# Patient Record
Sex: FEMALE | Race: WHITE | Employment: FULL TIME | URBAN - METROPOLITAN AREA
[De-identification: names, ages, dates, MRNs, and addresses within clinical notes are randomized per-mention and may not be internally consistent; named-entity substitution may affect disease eponyms.]

---

## 2017-01-12 NOTE — TELEPHONE ENCOUNTER
Confirmed patient needs refill of Vyvanse 30 mg. Aware Dr. Alpa Jessica is out of the office today. Last refilled on 12/13/16 for #30 with 0 rf. Last seen on 12/13/16. No future appointments.

## 2017-02-09 NOTE — PROGRESS NOTES
Anuel Morrell is a 46year old female. HPI:   Pt is here to f/u on vyvanse. She takes it around 8am, by 9am is helping her, she can focus, get work done.  She is getting her work done on time, not forgetting things all the time, she reports this is \ denies any unusual skin lesions or rashes  RESPIRATORY: denies shortness of breath with exertion  CARDIOVASCULAR: denies chest pain on exertion  GI: denies abdominal pain/n/v  NEURO: denies headaches  PSYCH: see HPI; good mood    EXAM:   /80 mmHg  Pu

## 2017-02-20 ENCOUNTER — TELEPHONE (OUTPATIENT)
Dept: FAMILY MEDICINE CLINIC | Facility: CLINIC | Age: 52
End: 2017-02-20

## 2017-02-20 NOTE — TELEPHONE ENCOUNTER
MD Madisyn Dc Nurse                     I added an afternoon dose of adderall to her am vyvanse about a week ago, can you please call to see how it's going, thank you           Left message on voicemail/answering machine for patient to

## 2017-04-07 NOTE — TELEPHONE ENCOUNTER
From: Anuel Morrell  To: Odalys Orozco MD  Sent: 4/7/2017 11:06 AM CDT  Subject: Medication Renewal Request    Original authorizing provider: MD Anuel Concepcion would like a refill of the following medications:  amphetamine-dextroamph

## 2017-04-10 RX ORDER — DEXTROAMPHETAMINE SACCHARATE, AMPHETAMINE ASPARTATE, DEXTROAMPHETAMINE SULFATE AND AMPHETAMINE SULFATE 2.5; 2.5; 2.5; 2.5 MG/1; MG/1; MG/1; MG/1
10 TABLET ORAL DAILY
Qty: 30 TABLET | Refills: 0 | Status: SHIPPED | OUTPATIENT
Start: 2017-04-10 | End: 2017-05-24

## 2017-04-11 NOTE — TELEPHONE ENCOUNTER
From: Marylee Lee  To: Galindo Gil MD  Sent: 4/10/2017 6:39 PM CDT  Subject: Medication Renewal Request    Original authorizing provider: Daryl Christian, MD Marylee Lee would like a refill of the following medications:  Lisdexamfetamine Dimes

## 2017-05-24 RX ORDER — DEXTROAMPHETAMINE SACCHARATE, AMPHETAMINE ASPARTATE, DEXTROAMPHETAMINE SULFATE AND AMPHETAMINE SULFATE 2.5; 2.5; 2.5; 2.5 MG/1; MG/1; MG/1; MG/1
10 TABLET ORAL DAILY
Qty: 30 TABLET | Refills: 0 | Status: SHIPPED | OUTPATIENT
Start: 2017-05-24 | End: 2017-08-08

## 2017-07-11 ENCOUNTER — MED REC SCAN ONLY (OUTPATIENT)
Dept: FAMILY MEDICINE CLINIC | Facility: CLINIC | Age: 52
End: 2017-07-11

## 2017-07-26 ENCOUNTER — TELEPHONE (OUTPATIENT)
Dept: FAMILY MEDICINE CLINIC | Facility: CLINIC | Age: 52
End: 2017-07-26

## 2017-07-26 NOTE — TELEPHONE ENCOUNTER
Patient is having some left knee pain. Wondering if it's a torn meniscus? She tore the one in the other knee and it feels similar. She is wondering if she can be seen tomorrow? Her daughter is getting  this weekend. Please call back.

## 2017-07-26 NOTE — TELEPHONE ENCOUNTER
Spoke with the pt and she did this at the end of June- about3 weeks ago    She was at a water park in the [facility]- but no known injury- she has been doing the RICE treatment- it is a little swollen and it is not getting better  She can not kneel on the

## 2017-07-26 NOTE — TELEPHONE ENCOUNTER
Called the pt and advised of the recommendations- she has an ortho at [place]- she will call them

## 2017-07-26 NOTE — TELEPHONE ENCOUNTER
She probably should be seen since not getting better in 3 weeks--she may want to call ortho directly Dr. Lendell Cowden or his partners a good place to start [phone]

## 2017-08-08 RX ORDER — DEXTROAMPHETAMINE SACCHARATE, AMPHETAMINE ASPARTATE, DEXTROAMPHETAMINE SULFATE AND AMPHETAMINE SULFATE 2.5; 2.5; 2.5; 2.5 MG/1; MG/1; MG/1; MG/1
10 TABLET ORAL DAILY
Qty: 30 TABLET | Refills: 0 | Status: SHIPPED | OUTPATIENT
Start: 2017-08-08 | End: 2017-10-13

## 2017-10-13 ENCOUNTER — OFFICE VISIT (OUTPATIENT)
Dept: FAMILY MEDICINE CLINIC | Facility: CLINIC | Age: 52
End: 2017-10-13

## 2017-10-13 VITALS
DIASTOLIC BLOOD PRESSURE: 88 MMHG | BODY MASS INDEX: 37 KG/M2 | SYSTOLIC BLOOD PRESSURE: 138 MMHG | WEIGHT: 191 LBS | TEMPERATURE: 98 F

## 2017-10-13 DIAGNOSIS — J01.00 ACUTE NON-RECURRENT MAXILLARY SINUSITIS: Primary | ICD-10-CM

## 2017-10-13 PROCEDURE — 99213 OFFICE O/P EST LOW 20 MIN: CPT | Performed by: PHYSICIAN ASSISTANT

## 2017-10-13 RX ORDER — DEXTROAMPHETAMINE SACCHARATE, AMPHETAMINE ASPARTATE, DEXTROAMPHETAMINE SULFATE AND AMPHETAMINE SULFATE 2.5; 2.5; 2.5; 2.5 MG/1; MG/1; MG/1; MG/1
10 TABLET ORAL DAILY
Qty: 30 TABLET | Refills: 0 | Status: SHIPPED | OUTPATIENT
Start: 2017-10-13 | End: 2017-11-22

## 2017-10-13 RX ORDER — DOXYCYCLINE HYCLATE 100 MG/1
100 CAPSULE ORAL 2 TIMES DAILY
Qty: 20 CAPSULE | Refills: 0 | Status: SHIPPED | OUTPATIENT
Start: 2017-10-13 | End: 2017-10-23

## 2017-10-13 RX ORDER — FLUTICASONE PROPIONATE 50 MCG
2 SPRAY, SUSPENSION (ML) NASAL DAILY
Qty: 1 BOTTLE | Refills: 1 | Status: SHIPPED | OUTPATIENT
Start: 2017-10-13 | End: 2018-07-10

## 2017-10-13 NOTE — PATIENT INSTRUCTIONS
1.  Doxycycline 100 mg twice daily for 10 days. 2.  Flonase as prescribed. 2 sprays in each nostril daily, or 1 spray in each nostril twice daily.   If you develop a nosebleed, stop medication and restart at half the dose (1 spray in each nostril daily) · Over-the-counter decongestants may be used unless a similar medicine was prescribed. Nasal sprays work the fastest. Use one that contains phenylephrine or oxymetazoline. First blow the nose gently. Then use the spray.  Do not use these medicines more ofte © 4855-3628 [address], [address] [place]. All rights reserved. This information is not intended as a substitute for professional medical care. Always follow your healthcare professional's instructions.

## 2017-10-13 NOTE — PROGRESS NOTES
CHIEF COMPLAINT:   Patient presents with:  Sinus Problem: x 2 weeks,       HPI:   Cony Murray is a 45year old female who presents for cold symptoms for  2  weeks. Symptoms have progressed into sinus congestion and been worsening since onset.  Sinus co Smoking status: Never Smoker                                                              Smokeless tobacco: Never Used                      Comment: Non-smoker  Alcohol use:  Yes              Comment: 2-4 servings/week        REVIEW OF SYSTEMS:   GENERAL: Sig: Take 1 capsule (100 mg total) by mouth 2 (two) times daily. Fluticasone Propionate 50 MCG/ACT Nasal Suspension 1 Bottle 1      Si sprays by Each Nare route daily.            Risks, benefits, side effects of medication addressed and explaine · Heat may help soothe painful areas of the face. Use a towel soaked in hot water. Or,  the shower and direct the hot spray onto your face.  Using a vaporizer along with a menthol rub at night may also help.   · An expectorant containing guaifenesin · Vision problems, including blurred or double vision  · Fever of 100.4ºF (38ºC) or higher, or as directed by your healthcare provider  · Seizure  · Breathing problems  · Symptoms not resolving within 10 days  Date Last Reviewed: 4/13/2015  © 0300-1011 The

## 2017-10-13 NOTE — TELEPHONE ENCOUNTER
amphetamine-dextroamphetamine (ADDERALL) 10 MG Oral Tab     Lisdexamfetamine Dimesylate (VYVANSE) 30 MG Oral Cap      Pt will be the one picking up scripts

## 2017-10-27 PROBLEM — F90.2 ADHD (ATTENTION DEFICIT HYPERACTIVITY DISORDER), COMBINED TYPE: Status: ACTIVE | Noted: 2017-10-27

## 2017-10-27 NOTE — PROGRESS NOTES
Jose Johnson is a 45year old female. HPI:   Pt is here to f/u on his ADHD. 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   Side effects: none  Ho PROC UNLISTED   Family History   Problem Relation Age of Onset   • Breast Cancer Mother    • Cancer Mother      breast   • Hypertension Father    • Other[other] [OTHER] Father    • Cancer Father      prostate   • Cancer Sister 46     breast-now cancer free

## 2017-11-22 NOTE — TELEPHONE ENCOUNTER
LOV  10/27/2017  Last refill  Vyvanse 10/13/2017 #30 w. 0 RF  Adderall  10/13/2017   #30 w. 0 RF. Patient aware [facility] out until Monday. Please hold until Monday when [facility] back in office.

## 2017-11-22 NOTE — TELEPHONE ENCOUNTER
Pt needs refills of both Adderral and Vyvanse, 3 months, please. Pt know [address] out until Monday.  Call when ready

## 2017-11-25 ENCOUNTER — CHARTING TRANS (OUTPATIENT)
Dept: OTHER | Age: 52
End: 2017-11-25

## 2017-11-27 RX ORDER — DEXTROAMPHETAMINE SACCHARATE, AMPHETAMINE ASPARTATE, DEXTROAMPHETAMINE SULFATE AND AMPHETAMINE SULFATE 2.5; 2.5; 2.5; 2.5 MG/1; MG/1; MG/1; MG/1
10 TABLET ORAL DAILY
Qty: 30 TABLET | Refills: 0 | Status: SHIPPED | OUTPATIENT
Start: 2017-11-27 | End: 2017-12-27

## 2017-11-27 RX ORDER — DEXTROAMPHETAMINE SACCHARATE, AMPHETAMINE ASPARTATE, DEXTROAMPHETAMINE SULFATE AND AMPHETAMINE SULFATE 2.5; 2.5; 2.5; 2.5 MG/1; MG/1; MG/1; MG/1
10 TABLET ORAL DAILY
Qty: 30 TABLET | Refills: 0 | Status: SHIPPED | OUTPATIENT
Start: 2017-12-27 | End: 2018-01-26

## 2017-11-27 RX ORDER — DEXTROAMPHETAMINE SACCHARATE, AMPHETAMINE ASPARTATE, DEXTROAMPHETAMINE SULFATE AND AMPHETAMINE SULFATE 2.5; 2.5; 2.5; 2.5 MG/1; MG/1; MG/1; MG/1
10 TABLET ORAL DAILY
Qty: 30 TABLET | Refills: 0 | Status: SHIPPED | OUTPATIENT
Start: 2017-11-27 | End: 2017-11-27 | Stop reason: CLARIF

## 2017-11-27 RX ORDER — DEXTROAMPHETAMINE SACCHARATE, AMPHETAMINE ASPARTATE, DEXTROAMPHETAMINE SULFATE AND AMPHETAMINE SULFATE 2.5; 2.5; 2.5; 2.5 MG/1; MG/1; MG/1; MG/1
10 TABLET ORAL DAILY
Qty: 30 TABLET | Refills: 0 | Status: SHIPPED | OUTPATIENT
Start: 2018-01-26 | End: 2018-02-25

## 2017-12-05 ENCOUNTER — HOSPITAL ENCOUNTER (OUTPATIENT)
Age: 52
Discharge: HOME | End: 2017-12-05
Payer: COMMERCIAL

## 2017-12-05 ENCOUNTER — APPOINTMENT (OUTPATIENT)
Dept: GENERAL RADIOLOGY | Age: 52
End: 2017-12-05
Attending: NURSE PRACTITIONER
Payer: COMMERCIAL

## 2017-12-05 VITALS
TEMPERATURE: 98 F | BODY MASS INDEX: 36 KG/M2 | HEART RATE: 101 BPM | DIASTOLIC BLOOD PRESSURE: 87 MMHG | RESPIRATION RATE: 16 BRPM | SYSTOLIC BLOOD PRESSURE: 135 MMHG | WEIGHT: 189 LBS | OXYGEN SATURATION: 98 %

## 2017-12-05 DIAGNOSIS — J20.9 ACUTE BRONCHITIS, UNSPECIFIED ORGANISM: ICD-10-CM

## 2017-12-05 DIAGNOSIS — J06.9 VIRAL UPPER RESPIRATORY TRACT INFECTION WITH COUGH: Primary | ICD-10-CM

## 2017-12-05 PROCEDURE — 71020 XR CHEST PA + LAT CHEST (CPT=71020): CPT | Performed by: NURSE PRACTITIONER

## 2017-12-05 PROCEDURE — 99213 OFFICE O/P EST LOW 20 MIN: CPT

## 2017-12-05 PROCEDURE — 99214 OFFICE O/P EST MOD 30 MIN: CPT

## 2017-12-05 RX ORDER — PREDNISONE 20 MG/1
40 TABLET ORAL DAILY
Qty: 10 TABLET | Refills: 0 | Status: SHIPPED | OUTPATIENT
Start: 2017-12-05 | End: 2017-12-10

## 2017-12-05 NOTE — ED PROVIDER NOTES
Patient Seen in: [address] [facility]    History   Patient presents with:  Cough/URI    Stated Complaint: coughing     49-year-old female presents today with complaints of congestion, runny nose, and cough.   States cough started out was produc oriented to person, place, and time. She appears well-developed and well-nourished. No distress. HENT:   Head: Normocephalic.    Right Ear: Hearing, tympanic membrane and external ear normal.   Left Ear: Hearing, tympanic membrane and external ear normal. wheezing, or uncontrolled fever.         Disposition and Plan     Clinical Impression:  Viral upper respiratory tract infection with cough  (primary encounter diagnosis)  Acute bronchitis, unspecified organism    Disposition:  Discharge  12/5/2017  1:53 pm

## 2017-12-08 ENCOUNTER — CHARTING TRANS (OUTPATIENT)
Dept: OTOLARYNGOLOGY | Age: 52
End: 2017-12-08

## 2018-01-09 ENCOUNTER — MED REC SCAN ONLY (OUTPATIENT)
Dept: FAMILY MEDICINE CLINIC | Facility: CLINIC | Age: 53
End: 2018-01-09

## 2018-04-06 RX ORDER — DEXTROAMPHETAMINE SACCHARATE, AMPHETAMINE ASPARTATE, DEXTROAMPHETAMINE SULFATE AND AMPHETAMINE SULFATE 2.5; 2.5; 2.5; 2.5 MG/1; MG/1; MG/1; MG/1
10 TABLET ORAL DAILY
Qty: 30 TABLET | Refills: 0 | Status: CANCELLED | OUTPATIENT
Start: 2018-04-06 | End: 2018-05-06

## 2018-04-06 RX ORDER — DEXTROAMPHETAMINE SACCHARATE, AMPHETAMINE ASPARTATE, DEXTROAMPHETAMINE SULFATE AND AMPHETAMINE SULFATE 2.5; 2.5; 2.5; 2.5 MG/1; MG/1; MG/1; MG/1
10 TABLET ORAL DAILY
Qty: 30 TABLET | Refills: 0 | Status: SHIPPED | OUTPATIENT
Start: 2018-04-06 | End: 2018-11-02

## 2018-04-06 NOTE — TELEPHONE ENCOUNTER
LRF   vyvanse 11/27/17  adderall  11/27/17    LOV 10/27/17  Future Appointments  Date Time Provider Gisel Atwood   7/10/2018 1:00 PM Lenny Vela MD [facility] Murtaza Murillo

## 2018-04-06 NOTE — TELEPHONE ENCOUNTER
Called and spoke to patient, notified that scripts are ready for . She states he has appt booked for July, as this is the earliest she could get in, will you need to see her before then or okay to wait until that physical appt.   Future Appointment

## 2018-04-07 NOTE — TELEPHONE ENCOUNTER
Pt picked up script for ADDERALL and VYVASE. Pt [place] DL # [id number]. Pt DL still shows last name Anna-new last name. Insurance will be update to new last name soon. Insurance still under old last name of Isa.

## 2018-04-12 ENCOUNTER — HOSPITAL ENCOUNTER (OUTPATIENT)
Age: 53
Discharge: HOME | End: 2018-04-12
Attending: FAMILY MEDICINE
Payer: COMMERCIAL

## 2018-04-12 VITALS
HEART RATE: 87 BPM | SYSTOLIC BLOOD PRESSURE: 137 MMHG | DIASTOLIC BLOOD PRESSURE: 83 MMHG | RESPIRATION RATE: 16 BRPM | TEMPERATURE: 99 F | OXYGEN SATURATION: 96 %

## 2018-04-12 DIAGNOSIS — J02.0 STREPTOCOCCAL SORE THROAT: Primary | ICD-10-CM

## 2018-04-12 PROCEDURE — 99214 OFFICE O/P EST MOD 30 MIN: CPT

## 2018-04-12 PROCEDURE — 87430 STREP A AG IA: CPT | Performed by: FAMILY MEDICINE

## 2018-04-12 PROCEDURE — 99213 OFFICE O/P EST LOW 20 MIN: CPT

## 2018-04-12 RX ORDER — CEPHALEXIN 500 MG/1
500 CAPSULE ORAL 3 TIMES DAILY
Qty: 30 CAPSULE | Refills: 0 | Status: SHIPPED | OUTPATIENT
Start: 2018-04-12 | End: 2018-04-22

## 2018-04-13 NOTE — ED PROVIDER NOTES
Patient Seen in: [address] [facility]    History   Patient presents with:  Sore Throat  Headache (neurologic)  Cough/URI  Ear Problem Pain (neurosensory)    Stated Complaint: sore throat  with body aches    HPI  52-year-old female presents drainage or sinus tenderness.   Throat: abnormal findings: moderate oropharyngeal erythema  Neck: mild anterior cervical adenopathy, no carotid bruit, no JVD and supple, symmetrical, trachea midline  Lungs: clear to auscultation bilaterally  Heart: S1, S2 n

## 2018-04-13 NOTE — ED INITIAL ASSESSMENT (HPI)
Pt sts sore throat, HA, body aches, ear pressure, cough, chills, fever (100.4) began last night. Sts had been feeling tired and run down earlier in week.

## 2018-07-10 ENCOUNTER — OFFICE VISIT (OUTPATIENT)
Dept: FAMILY MEDICINE CLINIC | Facility: CLINIC | Age: 53
End: 2018-07-10

## 2018-07-10 VITALS
DIASTOLIC BLOOD PRESSURE: 80 MMHG | HEART RATE: 72 BPM | BODY MASS INDEX: 37.11 KG/M2 | TEMPERATURE: 99 F | RESPIRATION RATE: 16 BRPM | SYSTOLIC BLOOD PRESSURE: 122 MMHG | WEIGHT: 189 LBS | HEIGHT: 60 IN

## 2018-07-10 DIAGNOSIS — M25.562 CHRONIC PAIN OF BOTH KNEES: ICD-10-CM

## 2018-07-10 DIAGNOSIS — Z00.00 ROUTINE HISTORY AND PHYSICAL EXAMINATION OF ADULT: Primary | ICD-10-CM

## 2018-07-10 DIAGNOSIS — G89.29 CHRONIC PAIN OF BOTH KNEES: ICD-10-CM

## 2018-07-10 DIAGNOSIS — Z12.11 COLON CANCER SCREENING: ICD-10-CM

## 2018-07-10 DIAGNOSIS — F90.2 ADHD (ATTENTION DEFICIT HYPERACTIVITY DISORDER), COMBINED TYPE: ICD-10-CM

## 2018-07-10 DIAGNOSIS — Z13.1 DIABETES MELLITUS SCREENING: ICD-10-CM

## 2018-07-10 DIAGNOSIS — Z12.31 ENCOUNTER FOR SCREENING MAMMOGRAM FOR BREAST CANCER: ICD-10-CM

## 2018-07-10 DIAGNOSIS — Z13.29 THYROID DISORDER SCREEN: ICD-10-CM

## 2018-07-10 DIAGNOSIS — Z13.0 SCREENING, ANEMIA, DEFICIENCY, IRON: ICD-10-CM

## 2018-07-10 DIAGNOSIS — M25.561 CHRONIC PAIN OF BOTH KNEES: ICD-10-CM

## 2018-07-10 DIAGNOSIS — Z13.220 LIPID SCREENING: ICD-10-CM

## 2018-07-10 DIAGNOSIS — E55.9 VITAMIN D DEFICIENCY: ICD-10-CM

## 2018-07-10 DIAGNOSIS — R53.83 FATIGUE, UNSPECIFIED TYPE: ICD-10-CM

## 2018-07-10 LAB
BASOPHILS # BLD AUTO: 0.03 X10(3) UL (ref 0–0.1)
BASOPHILS NFR BLD AUTO: 0.4 %
EOSINOPHIL # BLD AUTO: 0.04 X10(3) UL (ref 0–0.3)
EOSINOPHIL NFR BLD AUTO: 0.6 %
ERYTHROCYTE [DISTWIDTH] IN BLOOD BY AUTOMATED COUNT: 12.8 % (ref 11.5–16)
EST. AVERAGE GLUCOSE BLD GHB EST-MCNC: 100 MG/DL (ref 68–126)
HBA1C MFR BLD HPLC: 5.1 % (ref ?–5.7)
HCT VFR BLD AUTO: 42.4 % (ref 34–50)
HGB BLD-MCNC: 14 G/DL (ref 12–16)
IMMATURE GRANULOCYTE COUNT: 0.02 X10(3) UL (ref 0–1)
IMMATURE GRANULOCYTE RATIO %: 0.3 %
LYMPHOCYTES # BLD AUTO: 2.2 X10(3) UL (ref 0.9–4)
LYMPHOCYTES NFR BLD AUTO: 31.8 %
MCH RBC QN AUTO: 30.4 PG (ref 27–33.2)
MCHC RBC AUTO-ENTMCNC: 33 G/DL (ref 31–37)
MCV RBC AUTO: 92.2 FL (ref 81–100)
MONOCYTES # BLD AUTO: 0.43 X10(3) UL (ref 0.1–1)
MONOCYTES NFR BLD AUTO: 6.2 %
NEUTROPHIL ABS PRELIM: 4.19 X10 (3) UL (ref 1.3–6.7)
NEUTROPHILS # BLD AUTO: 4.19 X10(3) UL (ref 1.3–6.7)
NEUTROPHILS NFR BLD AUTO: 60.7 %
PLATELET # BLD AUTO: 322 10(3)UL (ref 150–450)
RBC # BLD AUTO: 4.6 X10(6)UL (ref 3.8–5.1)
RED CELL DISTRIBUTION WIDTH-SD: 42.5 FL (ref 35.1–46.3)
WBC # BLD AUTO: 6.9 X10(3) UL (ref 4–13)

## 2018-07-10 PROCEDURE — 86376 MICROSOMAL ANTIBODY EACH: CPT | Performed by: FAMILY MEDICINE

## 2018-07-10 PROCEDURE — 86800 THYROGLOBULIN ANTIBODY: CPT | Performed by: FAMILY MEDICINE

## 2018-07-10 PROCEDURE — 83735 ASSAY OF MAGNESIUM: CPT | Performed by: FAMILY MEDICINE

## 2018-07-10 PROCEDURE — 84439 ASSAY OF FREE THYROXINE: CPT | Performed by: FAMILY MEDICINE

## 2018-07-10 PROCEDURE — 36415 COLL VENOUS BLD VENIPUNCTURE: CPT | Performed by: FAMILY MEDICINE

## 2018-07-10 PROCEDURE — 80050 GENERAL HEALTH PANEL: CPT | Performed by: FAMILY MEDICINE

## 2018-07-10 PROCEDURE — 83036 HEMOGLOBIN GLYCOSYLATED A1C: CPT | Performed by: FAMILY MEDICINE

## 2018-07-10 PROCEDURE — 99396 PREV VISIT EST AGE 40-64: CPT | Performed by: FAMILY MEDICINE

## 2018-07-10 PROCEDURE — 82607 VITAMIN B-12: CPT | Performed by: FAMILY MEDICINE

## 2018-07-10 PROCEDURE — 84480 ASSAY TRIIODOTHYRONINE (T3): CPT | Performed by: FAMILY MEDICINE

## 2018-07-10 PROCEDURE — 80061 LIPID PANEL: CPT | Performed by: FAMILY MEDICINE

## 2018-07-10 PROCEDURE — 82306 VITAMIN D 25 HYDROXY: CPT | Performed by: FAMILY MEDICINE

## 2018-07-10 PROCEDURE — 82728 ASSAY OF FERRITIN: CPT | Performed by: FAMILY MEDICINE

## 2018-07-10 RX ORDER — MULTIVIT-MIN/IRON FUM/FOLIC AC 7.5 MG-4
1 TABLET ORAL DAILY
COMMUNITY

## 2018-07-10 RX ORDER — DEXTROAMPHETAMINE SACCHARATE, AMPHETAMINE ASPARTATE, DEXTROAMPHETAMINE SULFATE AND AMPHETAMINE SULFATE 2.5; 2.5; 2.5; 2.5 MG/1; MG/1; MG/1; MG/1
TABLET ORAL
Qty: 30 TABLET | Refills: 0 | Status: SHIPPED | OUTPATIENT
Start: 2018-07-10 | End: 2018-09-08

## 2018-07-10 RX ORDER — FLUTICASONE PROPIONATE 50 MCG
2 SPRAY, SUSPENSION (ML) NASAL DAILY
Qty: 1 BOTTLE | Refills: 1 | Status: SHIPPED | OUTPATIENT
Start: 2018-07-10 | End: 2019-07-05

## 2018-07-10 RX ORDER — ALBUTEROL SULFATE 90 UG/1
2 AEROSOL, METERED RESPIRATORY (INHALATION) EVERY 4 HOURS PRN
Qty: 1 INHALER | Refills: 1 | Status: SHIPPED | OUTPATIENT
Start: 2018-07-10 | End: 2021-03-19

## 2018-07-10 RX ORDER — DEXTROAMPHETAMINE SACCHARATE, AMPHETAMINE ASPARTATE, DEXTROAMPHETAMINE SULFATE AND AMPHETAMINE SULFATE 2.5; 2.5; 2.5; 2.5 MG/1; MG/1; MG/1; MG/1
TABLET ORAL
Qty: 30 TABLET | Refills: 0 | Status: SHIPPED | OUTPATIENT
Start: 2018-07-10 | End: 2018-08-09

## 2018-07-10 RX ORDER — DEXTROAMPHETAMINE SACCHARATE, AMPHETAMINE ASPARTATE, DEXTROAMPHETAMINE SULFATE AND AMPHETAMINE SULFATE 2.5; 2.5; 2.5; 2.5 MG/1; MG/1; MG/1; MG/1
TABLET ORAL
Qty: 30 TABLET | Refills: 0 | Status: SHIPPED | OUTPATIENT
Start: 2018-07-10 | End: 2018-10-08

## 2018-07-10 RX ORDER — ALBUTEROL SULFATE 90 UG/1
2 AEROSOL, METERED RESPIRATORY (INHALATION) EVERY 4 HOURS PRN
Qty: 1 INHALER | Refills: 1 | Status: SHIPPED | OUTPATIENT
Start: 2018-07-10 | End: 2018-07-10

## 2018-07-10 NOTE — PROGRESS NOTES
HPI:   Tomás Tabares is a 52year old female who presents for a complete physical exam.  Patient complains of bi knee issues.  [facility]     If she sits for awhile she feels like she walks like a 91yo when she first gets up an (MULTI-VITAMIN/MINERALS) Oral Tab Take 1 tablet by mouth daily. Disp:  Rfl:    TURMERIC CURCUMIN OR Take by mouth. Disp:  Rfl:    Glucosamine-Chondroit-MSM-C-Mn (FLEXI JOINT OR) Take by mouth.  Disp:  Rfl:    Albuterol Sulfate  (90 Base) MCG/ACT Jude Kraft Other [OTHER] Sister    • Other Walter Payne Sister      RA   • Other [OTHER] Daughter      histiocytosis, migraines      Social History:   Smoking status: Never Smoker                                                              Smokeless tobacco: Never Used auscultation  CARDIO: RRR without murmur  GI: good BS's,no masses, HSM or tenderness  MUSCULOSKELETAL: back is not tender, FROM of UEs and LEs bilaterally; mild crepitus with flexion/extension of knees, no reproducible pain, no redness/swelling, no instabi

## 2018-07-11 LAB
25-HYDROXYVITAMIN D (TOTAL): 31.4 NG/ML (ref 30–100)
ALBUMIN SERPL-MCNC: 3.9 G/DL (ref 3.5–4.8)
ALP LIVER SERPL-CCNC: 100 U/L (ref 41–108)
ALT SERPL-CCNC: 32 U/L (ref 14–54)
ANTI-THYROGLOBULIN: 23 U/ML (ref ?–60)
ANTI-THYROPEROXIDASE: <28 U/ML (ref ?–60)
AST SERPL-CCNC: 21 U/L (ref 15–41)
BILIRUB SERPL-MCNC: 0.7 MG/DL (ref 0.1–2)
BUN BLD-MCNC: 19 MG/DL (ref 8–20)
CALCIUM BLD-MCNC: 9.5 MG/DL (ref 8.3–10.3)
CHLORIDE: 103 MMOL/L (ref 101–111)
CHOLEST SMN-MCNC: 235 MG/DL (ref ?–200)
CO2: 27 MMOL/L (ref 22–32)
CREAT BLD-MCNC: 0.74 MG/DL (ref 0.55–1.02)
DEPRECATED HBV CORE AB SER IA-ACNC: 54.2 NG/ML (ref 18–340)
FREE T4: 1.1 NG/DL (ref 0.9–1.8)
GLUCOSE BLD-MCNC: 87 MG/DL (ref 70–99)
HAV AB SERPL IA-ACNC: 763 PG/ML (ref 193–986)
HAV IGM SER QL: 2.1 MG/DL (ref 1.8–2.5)
HDLC SERPL-MCNC: 71 MG/DL (ref 45–?)
HDLC SERPL: 3.31 {RATIO} (ref ?–4.44)
LDLC SERPL CALC-MCNC: 139 MG/DL (ref ?–130)
M PROTEIN MFR SERPL ELPH: 7.5 G/DL (ref 6.1–8.3)
NONHDLC SERPL-MCNC: 164 MG/DL (ref ?–130)
POTASSIUM SERPL-SCNC: 3.7 MMOL/L (ref 3.6–5.1)
SODIUM SERPL-SCNC: 138 MMOL/L (ref 136–144)
T3 SERPL-MCNC: 95 NG/DL (ref 60–181)
TRIGL SERPL-MCNC: 123 MG/DL (ref ?–150)
TSI SER-ACNC: 1.16 MIU/ML (ref 0.35–5.5)
VLDLC SERPL CALC-MCNC: 25 MG/DL (ref 5–40)

## 2018-10-01 ENCOUNTER — CHARTING TRANS (OUTPATIENT)
Dept: OTHER | Age: 53
End: 2018-10-01

## 2018-11-02 RX ORDER — DEXTROAMPHETAMINE SACCHARATE, AMPHETAMINE ASPARTATE, DEXTROAMPHETAMINE SULFATE AND AMPHETAMINE SULFATE 2.5; 2.5; 2.5; 2.5 MG/1; MG/1; MG/1; MG/1
10 TABLET ORAL DAILY
Qty: 30 TABLET | Refills: 0 | Status: SHIPPED | OUTPATIENT
Start: 2018-12-02 | End: 2019-01-02

## 2018-11-02 RX ORDER — DEXTROAMPHETAMINE SACCHARATE, AMPHETAMINE ASPARTATE, DEXTROAMPHETAMINE SULFATE AND AMPHETAMINE SULFATE 2.5; 2.5; 2.5; 2.5 MG/1; MG/1; MG/1; MG/1
10 TABLET ORAL DAILY
Qty: 30 TABLET | Refills: 0 | Status: SHIPPED | OUTPATIENT
Start: 2018-11-02 | End: 2018-11-02

## 2018-11-02 NOTE — TELEPHONE ENCOUNTER
adderall Last refilled on 4/6/18  for # 30 with 0 refills  vyvanse refilled 4/6/18 #30 with 0 refills  Last seen on 7/10/18  No future appointments. Ashing for 3 months of scripts    Thank you.

## 2018-11-02 NOTE — TELEPHONE ENCOUNTER
Left detailed message with information. Notified that office visit is needed prior to refills.    Scripts placed in blue book

## 2018-11-03 ENCOUNTER — CHARTING TRANS (OUTPATIENT)
Dept: OTHER | Age: 53
End: 2018-11-03

## 2018-11-03 ASSESSMENT — PAIN SCALES - GENERAL: PAINLEVEL_OUTOF10: 4

## 2018-11-27 VITALS — WEIGHT: 189 LBS | HEIGHT: 61 IN | BODY MASS INDEX: 35.68 KG/M2

## 2019-03-05 VITALS
OXYGEN SATURATION: 100 % | RESPIRATION RATE: 16 BRPM | TEMPERATURE: 98.3 F | HEART RATE: 76 BPM | SYSTOLIC BLOOD PRESSURE: 118 MMHG | DIASTOLIC BLOOD PRESSURE: 74 MMHG

## 2019-03-05 VITALS — BODY MASS INDEX: 34.93 KG/M2 | HEIGHT: 61 IN | WEIGHT: 185 LBS

## 2019-07-23 ENCOUNTER — PATIENT OUTREACH (OUTPATIENT)
Dept: FAMILY MEDICINE CLINIC | Facility: CLINIC | Age: 54
End: 2019-07-23

## 2019-12-17 ENCOUNTER — WALK IN (OUTPATIENT)
Dept: URGENT CARE | Age: 54
End: 2019-12-17

## 2019-12-17 VITALS
RESPIRATION RATE: 16 BRPM | TEMPERATURE: 98.2 F | HEART RATE: 75 BPM | SYSTOLIC BLOOD PRESSURE: 118 MMHG | DIASTOLIC BLOOD PRESSURE: 70 MMHG | OXYGEN SATURATION: 97 %

## 2019-12-17 DIAGNOSIS — R07.9 CHEST PAIN, UNSPECIFIED TYPE: Primary | ICD-10-CM

## 2019-12-17 PROCEDURE — 99214 OFFICE O/P EST MOD 30 MIN: CPT | Performed by: FAMILY MEDICINE

## 2019-12-17 RX ORDER — DOXYCYCLINE HYCLATE 100 MG/1
100 CAPSULE ORAL 2 TIMES DAILY
Qty: 20 CAPSULE | Refills: 0 | Status: SHIPPED | OUTPATIENT
Start: 2019-12-17 | End: 2019-12-27

## 2019-12-17 RX ORDER — CODEINE PHOSPHATE AND GUAIFENESIN 10; 100 MG/5ML; MG/5ML
5 SOLUTION ORAL 4 TIMES DAILY PRN
Qty: 240 ML | Refills: 0 | Status: SHIPPED | OUTPATIENT
Start: 2019-12-17 | End: 2019-12-27

## 2020-02-12 ENCOUNTER — HOSPITAL ENCOUNTER (OUTPATIENT)
Dept: GENERAL RADIOLOGY | Age: 55
Discharge: HOME | End: 2020-02-12
Attending: FAMILY MEDICINE
Payer: COMMERCIAL

## 2020-02-12 ENCOUNTER — OFFICE VISIT (OUTPATIENT)
Dept: FAMILY MEDICINE CLINIC | Facility: CLINIC | Age: 55
End: 2020-02-12
Payer: COMMERCIAL

## 2020-02-12 DIAGNOSIS — R06.02 SOB (SHORTNESS OF BREATH): ICD-10-CM

## 2020-02-12 DIAGNOSIS — R07.89 CHEST DISCOMFORT: ICD-10-CM

## 2020-02-12 DIAGNOSIS — R45.86 EMOTIONAL LABILITY: ICD-10-CM

## 2020-02-12 DIAGNOSIS — H53.8 BLURRED VISION: ICD-10-CM

## 2020-02-12 DIAGNOSIS — R30.0 DYSURIA: Primary | ICD-10-CM

## 2020-02-12 DIAGNOSIS — N39.3 STRESS INCONTINENCE: ICD-10-CM

## 2020-02-12 DIAGNOSIS — R39.15 URINARY URGENCY: ICD-10-CM

## 2020-02-12 DIAGNOSIS — R35.0 URINARY FREQUENCY: ICD-10-CM

## 2020-02-12 LAB
ALBUMIN SERPL-MCNC: 3.8 G/DL (ref 3.4–5)
ALBUMIN/GLOB SERPL: 0.9 {RATIO} (ref 1–2)
ALP LIVER SERPL-CCNC: 115 U/L (ref 46–118)
ALT SERPL-CCNC: 30 U/L (ref 13–56)
ANION GAP SERPL CALC-SCNC: 2 MMOL/L (ref 0–18)
APPEARANCE: CLEAR
AST SERPL-CCNC: 21 U/L (ref 15–37)
BILIRUB SERPL-MCNC: 0.5 MG/DL (ref 0.1–2)
BILIRUB UR QL STRIP.AUTO: NEGATIVE
BUN BLD-MCNC: 16 MG/DL (ref 7–18)
BUN/CREAT SERPL: 21.9 (ref 10–20)
CALCIUM BLD-MCNC: 9.3 MG/DL (ref 8.5–10.1)
CHLORIDE SERPL-SCNC: 106 MMOL/L (ref 98–112)
CHOLEST SMN-MCNC: 214 MG/DL (ref ?–200)
CLARITY UR REFRACT.AUTO: CLEAR
CO2 SERPL-SCNC: 30 MMOL/L (ref 21–32)
CREAT BLD-MCNC: 0.73 MG/DL (ref 0.55–1.02)
GLOBULIN PLAS-MCNC: 4.1 G/DL (ref 2.8–4.4)
GLUCOSE BLD-MCNC: 93 MG/DL (ref 70–99)
GLUCOSE UR STRIP.AUTO-MCNC: NEGATIVE MG/DL
HDLC SERPL-MCNC: 80 MG/DL (ref 40–59)
KETONES UR STRIP.AUTO-MCNC: NEGATIVE MG/DL
LDLC SERPL CALC-MCNC: 99 MG/DL (ref ?–100)
LEUKOCYTE ESTERASE UR QL STRIP.AUTO: NEGATIVE
M PROTEIN MFR SERPL ELPH: 7.9 G/DL (ref 6.4–8.2)
MULTISTIX LOT#: NORMAL NUMERIC
NITRITE UR QL STRIP.AUTO: NEGATIVE
NONHDLC SERPL-MCNC: 134 MG/DL (ref ?–130)
OSMOLALITY SERPL CALC.SUM OF ELEC: 287 MOSM/KG (ref 275–295)
PATIENT FASTING Y/N/NP: YES
PATIENT FASTING Y/N/NP: YES
PH UR STRIP.AUTO: 7 [PH] (ref 4.5–8)
PH, URINE: 7 (ref 4.5–8)
POTASSIUM SERPL-SCNC: 3.9 MMOL/L (ref 3.5–5.1)
PROT UR STRIP.AUTO-MCNC: NEGATIVE MG/DL
RBC UR QL AUTO: NEGATIVE
SODIUM SERPL-SCNC: 138 MMOL/L (ref 136–145)
SP GR UR STRIP.AUTO: 1.01 (ref 1–1.03)
SPECIFIC GRAVITY: 1.01 (ref 1–1.03)
TRIGL SERPL-MCNC: 174 MG/DL (ref 30–149)
TSI SER-ACNC: 0.92 MIU/ML (ref 0.36–3.74)
URINE-COLOR: YELLOW
UROBILINOGEN UR STRIP.AUTO-MCNC: <2 MG/DL
UROBILINOGEN,SEMI-QN: 1 MG/DL (ref 0–1.9)
VIT D+METAB SERPL-MCNC: 38.8 NG/ML (ref 30–100)
VLDLC SERPL CALC-MCNC: 35 MG/DL (ref 0–30)

## 2020-02-12 PROCEDURE — 84443 ASSAY THYROID STIM HORMONE: CPT | Performed by: FAMILY MEDICINE

## 2020-02-12 PROCEDURE — 71046 X-RAY EXAM CHEST 2 VIEWS: CPT | Performed by: FAMILY MEDICINE

## 2020-02-12 PROCEDURE — 82306 VITAMIN D 25 HYDROXY: CPT | Performed by: FAMILY MEDICINE

## 2020-02-12 PROCEDURE — 80061 LIPID PANEL: CPT | Performed by: FAMILY MEDICINE

## 2020-02-12 PROCEDURE — 93000 ELECTROCARDIOGRAM COMPLETE: CPT | Performed by: FAMILY MEDICINE

## 2020-02-12 PROCEDURE — 80053 COMPREHEN METABOLIC PANEL: CPT | Performed by: FAMILY MEDICINE

## 2020-02-12 PROCEDURE — 36415 COLL VENOUS BLD VENIPUNCTURE: CPT | Performed by: FAMILY MEDICINE

## 2020-02-12 PROCEDURE — 81003 URINALYSIS AUTO W/O SCOPE: CPT | Performed by: FAMILY MEDICINE

## 2020-02-12 PROCEDURE — 99215 OFFICE O/P EST HI 40 MIN: CPT | Performed by: FAMILY MEDICINE

## 2020-02-12 NOTE — PROGRESS NOTES
Luis Carlos Morrison is a 62year old female. HPI:   Patient reports months of urinary frequency, urgency, stress incontinence. Wakes up ~4am to go to bathroom then 10x/day after that. Laughing, sneezing will cause significant leakage.   Can leak when otherwise  SKIN: denies any unusual skin lesions or rashes  HEENT: no URI symptos now nor recently  RESPIRATORY: denies shortness of breath with exertion  CARDIOVASCULAR: denies chest pain on exertion  GI: denies abdominal pain and denies heartburn  <MUSC: W/ DIFFERENTIAL    Emotional lability-suspect relatetd to stress; if w/u neg rec therapy  -     VENIPUNCTURE  -     CBC WITH DIFFERENTIAL WITH PLATELET; Future  -     COMP METABOLIC PANEL (14); Future  -     LIPID PANEL;  Future  -     ASSAY, THYROID STIM H

## 2020-02-13 ENCOUNTER — TELEPHONE (OUTPATIENT)
Dept: FAMILY MEDICINE CLINIC | Facility: CLINIC | Age: 55
End: 2020-02-13

## 2020-02-13 NOTE — TELEPHONE ENCOUNTER
Notes recorded by Pam Wilson MD on 2/13/2020 at 11:03 AM CST  Please nptify patient the labs that came in look good: send out u/a, vitamin D, kidneys, liver, electrolytes, thyroid, cholestrtol.  But it looks like the lab was unable to run the CBC and A

## 2020-02-13 NOTE — TELEPHONE ENCOUNTER
Patient notified and verbalized understanding. Nurse visit scheduled.   Future Appointments   Date Time Provider Gisel Atwood   2/14/2020 11:00 AM  [facility] EM[facility] Tamie Escudero

## 2020-02-14 ENCOUNTER — NURSE ONLY (OUTPATIENT)
Dept: FAMILY MEDICINE CLINIC | Facility: CLINIC | Age: 55
End: 2020-02-14
Payer: COMMERCIAL

## 2020-02-14 DIAGNOSIS — H53.8 DRUG-INDUCED DISORDER OF REFRACTION AND ACCOMMODATION: ICD-10-CM

## 2020-02-14 DIAGNOSIS — N39.3 FEMALE STRESS INCONTINENCE: ICD-10-CM

## 2020-02-14 DIAGNOSIS — T88.7XXA DRUG-INDUCED DISORDER OF REFRACTION AND ACCOMMODATION: ICD-10-CM

## 2020-02-14 DIAGNOSIS — R06.02 SHORTNESS OF BREATH: ICD-10-CM

## 2020-02-14 DIAGNOSIS — R35.0 URINARY FREQUENCY: ICD-10-CM

## 2020-02-14 DIAGNOSIS — N39.8 HYMENOURETHRAL FUSION: ICD-10-CM

## 2020-02-14 DIAGNOSIS — R45.86 EMOTIONAL LABILITY: ICD-10-CM

## 2020-02-14 LAB
BASOPHILS # BLD AUTO: 0.04 X10(3) UL (ref 0–0.2)
BASOPHILS NFR BLD AUTO: 0.5 %
DEPRECATED RDW RBC AUTO: 41.8 FL (ref 35.1–46.3)
EOSINOPHIL # BLD AUTO: 0.06 X10(3) UL (ref 0–0.7)
EOSINOPHIL NFR BLD AUTO: 0.8 %
ERYTHROCYTE [DISTWIDTH] IN BLOOD BY AUTOMATED COUNT: 12.1 % (ref 11–15)
EST. AVERAGE GLUCOSE BLD GHB EST-MCNC: 103 MG/DL (ref 68–126)
HBA1C MFR BLD HPLC: 5.2 % (ref ?–5.7)
HCT VFR BLD AUTO: 42.8 % (ref 35–48)
HGB BLD-MCNC: 14.2 G/DL (ref 12–16)
IMM GRANULOCYTES # BLD AUTO: 0.02 X10(3) UL (ref 0–1)
IMM GRANULOCYTES NFR BLD: 0.3 %
LYMPHOCYTES # BLD AUTO: 1.79 X10(3) UL (ref 1–4)
LYMPHOCYTES NFR BLD AUTO: 24.1 %
MCH RBC QN AUTO: 31.2 PG (ref 26–34)
MCHC RBC AUTO-ENTMCNC: 33.2 G/DL (ref 31–37)
MCV RBC AUTO: 94.1 FL (ref 80–100)
MONOCYTES # BLD AUTO: 0.46 X10(3) UL (ref 0.1–1)
MONOCYTES NFR BLD AUTO: 6.2 %
NEUTROPHILS # BLD AUTO: 5.07 X10 (3) UL (ref 1.5–7.7)
NEUTROPHILS # BLD AUTO: 5.07 X10(3) UL (ref 1.5–7.7)
NEUTROPHILS NFR BLD AUTO: 68.1 %
PLATELET # BLD AUTO: 285 10(3)UL (ref 150–450)
RBC # BLD AUTO: 4.55 X10(6)UL (ref 3.8–5.3)
WBC # BLD AUTO: 7.4 X10(3) UL (ref 4–11)

## 2020-02-14 PROCEDURE — 85025 COMPLETE CBC W/AUTO DIFF WBC: CPT | Performed by: FAMILY MEDICINE

## 2020-02-14 PROCEDURE — 36415 COLL VENOUS BLD VENIPUNCTURE: CPT | Performed by: FAMILY MEDICINE

## 2020-02-14 PROCEDURE — 83036 HEMOGLOBIN GLYCOSYLATED A1C: CPT | Performed by: FAMILY MEDICINE

## 2020-02-14 NOTE — PROGRESS NOTES
Patient to clinic for lab redraw of CBC and A1c due to clotted specimen from 2/12/2020    No venipuncture charge    Lavender tube drawn right AC x 1 attempt

## 2020-02-20 ENCOUNTER — TELEPHONE (OUTPATIENT)
Dept: FAMILY MEDICINE CLINIC | Facility: CLINIC | Age: 55
End: 2020-02-20

## 2020-02-25 ENCOUNTER — OFFICE VISIT (OUTPATIENT)
Dept: OTOLARYNGOLOGY | Age: 55
End: 2020-02-25

## 2020-02-25 VITALS — WEIGHT: 189 LBS | HEIGHT: 61 IN | BODY MASS INDEX: 35.68 KG/M2

## 2020-02-25 DIAGNOSIS — H92.01 REFERRED OTALGIA OF RIGHT EAR: Primary | ICD-10-CM

## 2020-02-25 DIAGNOSIS — H95.191 ENCOUNTER FOR MASTOIDECTOMY CAVITY DEBRIDEMENT, RIGHT: ICD-10-CM

## 2020-02-25 PROCEDURE — 69220 CLEAN OUT MASTOID CAVITY: CPT | Performed by: PHYSICIAN ASSISTANT

## 2020-02-25 PROCEDURE — 99213 OFFICE O/P EST LOW 20 MIN: CPT | Performed by: PHYSICIAN ASSISTANT

## 2020-03-02 VITALS
DIASTOLIC BLOOD PRESSURE: 88 MMHG | RESPIRATION RATE: 18 BRPM | BODY MASS INDEX: 36.37 KG/M2 | HEIGHT: 61 IN | HEART RATE: 74 BPM | SYSTOLIC BLOOD PRESSURE: 124 MMHG | WEIGHT: 192.63 LBS | TEMPERATURE: 99 F

## 2020-10-22 ENCOUNTER — TELEPHONE (OUTPATIENT)
Dept: FAMILY MEDICINE CLINIC | Facility: CLINIC | Age: 55
End: 2020-10-22

## 2020-10-22 ENCOUNTER — TELEMEDICINE (OUTPATIENT)
Dept: FAMILY MEDICINE CLINIC | Facility: CLINIC | Age: 55
End: 2020-10-22
Payer: COMMERCIAL

## 2020-10-22 DIAGNOSIS — R68.83 CHILLS: ICD-10-CM

## 2020-10-22 DIAGNOSIS — R19.7 DIARRHEA, UNSPECIFIED TYPE: Primary | ICD-10-CM

## 2020-10-22 PROCEDURE — 99213 OFFICE O/P EST LOW 20 MIN: CPT | Performed by: FAMILY MEDICINE

## 2020-10-22 NOTE — TELEPHONE ENCOUNTER
Pt called because she has diarrhea. 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 Pt doesn't know if it is necessary. Please advise.

## 2020-10-22 NOTE — TELEPHONE ENCOUNTER
Pt was advised that DS can do VV today at 2:20    Meghanerum Henao verbally consents to  a Virtual/Telephone Check-In service on 10/22/20  Patient understands and accepts financial responsibility for any deductible, co-insurance and/or co-pays associ

## 2020-10-23 ENCOUNTER — LAB ENCOUNTER (OUTPATIENT)
Dept: LAB | Age: 55
End: 2020-10-23
Attending: FAMILY MEDICINE
Payer: COMMERCIAL

## 2020-10-23 DIAGNOSIS — R19.7 DIARRHEA, UNSPECIFIED TYPE: ICD-10-CM

## 2020-10-23 DIAGNOSIS — R68.83 CHILLS: ICD-10-CM

## 2020-10-23 PROCEDURE — U0003 INFECTIOUS AGENT DETECTION BY NUCLEIC ACID (DNA OR RNA); SEVERE ACUTE RESPIRATORY SYNDROME CORONAVIRUS 2 (SARS-COV-2) (CORONAVIRUS DISEASE [COVID-19]), AMPLIFIED PROBE TECHNIQUE, MAKING USE OF HIGH THROUGHPUT TECHNOLOGIES AS DESCRIBED BY CMS-2020-01-R: HCPCS | Performed by: FAMILY MEDICINE

## 2020-10-27 ENCOUNTER — TELEPHONE (OUTPATIENT)
Dept: FAMILY MEDICINE CLINIC | Facility: CLINIC | Age: 55
End: 2020-10-27

## 2020-10-27 NOTE — TELEPHONE ENCOUNTER
----- Message from Marisela Chiu DO sent at 10/26/2020  6:34 PM CDT -----  Can notify Sumaya her Covid test was negative

## 2020-11-04 ENCOUNTER — MED REC SCAN ONLY (OUTPATIENT)
Dept: FAMILY MEDICINE CLINIC | Facility: CLINIC | Age: 55
End: 2020-11-04

## 2020-11-17 ENCOUNTER — OFFICE VISIT (OUTPATIENT)
Dept: SURGERY | Facility: CLINIC | Age: 55
End: 2020-11-17
Payer: COMMERCIAL

## 2020-11-17 VITALS — HEIGHT: 61 IN | BODY MASS INDEX: 36.25 KG/M2 | TEMPERATURE: 97 F | WEIGHT: 192 LBS

## 2020-11-17 DIAGNOSIS — Z12.11 SCREEN FOR COLON CANCER: Primary | ICD-10-CM

## 2020-11-17 PROCEDURE — 3008F BODY MASS INDEX DOCD: CPT | Performed by: SURGERY

## 2020-11-17 PROCEDURE — S0285 CNSLT BEFORE SCREEN COLONOSC: HCPCS | Performed by: SURGERY

## 2020-11-17 RX ORDER — SODIUM, POTASSIUM,MAG SULFATES 17.5-3.13G
SOLUTION, RECONSTITUTED, ORAL ORAL
Qty: 1 KIT | Refills: 0 | Status: SHIPPED | OUTPATIENT
Start: 2020-11-17 | End: 2020-12-15 | Stop reason: ALTCHOICE

## 2020-11-17 NOTE — H&P
Luis Carlos Morrison is a 60year old female  Patient presents with:  Colonoscopy: New patient refered by Dr. Jese Enciso for colonoscopy consult. pt has never had a colonoscopy. father had colon polyps. pt states she has always had constipation.        Adelso Godoy status: Never Smoker      Smokeless tobacco: Never Used      Tobacco comment: Non-smoker    Alcohol use: Yes      Comment: 2-4 servings/week    Drug use: No      ROS     GENERAL HEALTH: otherwise feels well, no weight loss, no fever or chills  SKIN: denies the SARS-CoV-2 viral RNA from individuals who are suspected of COVID-19 infection by their healthcare provider. A \"Detected\" result is considered a positive test result for COVID-19.    A \"Not Detected\" result for this test means that SARS-CoV-2 RNA

## 2020-12-14 ENCOUNTER — PATIENT OUTREACH (OUTPATIENT)
Dept: SURGERY | Facility: CLINIC | Age: 55
End: 2020-12-14

## 2020-12-15 ENCOUNTER — OFFICE VISIT (OUTPATIENT)
Dept: FAMILY MEDICINE CLINIC | Facility: CLINIC | Age: 55
End: 2020-12-15
Payer: COMMERCIAL

## 2020-12-15 VITALS
OXYGEN SATURATION: 98 % | SYSTOLIC BLOOD PRESSURE: 118 MMHG | TEMPERATURE: 98 F | RESPIRATION RATE: 16 BRPM | DIASTOLIC BLOOD PRESSURE: 80 MMHG | HEART RATE: 73 BPM | HEIGHT: 61 IN | BODY MASS INDEX: 36.82 KG/M2 | WEIGHT: 195 LBS

## 2020-12-15 DIAGNOSIS — Z13.29 THYROID DISORDER SCREEN: ICD-10-CM

## 2020-12-15 DIAGNOSIS — Z12.4 CERVICAL CANCER SCREENING: ICD-10-CM

## 2020-12-15 DIAGNOSIS — Z13.1 DIABETES MELLITUS SCREENING: ICD-10-CM

## 2020-12-15 DIAGNOSIS — Z20.822 SUSPECTED COVID-19 VIRUS INFECTION: ICD-10-CM

## 2020-12-15 DIAGNOSIS — Z11.3 ROUTINE SCREENING FOR STI (SEXUALLY TRANSMITTED INFECTION): ICD-10-CM

## 2020-12-15 DIAGNOSIS — F90.2 ADHD (ATTENTION DEFICIT HYPERACTIVITY DISORDER), COMBINED TYPE: ICD-10-CM

## 2020-12-15 DIAGNOSIS — Z13.0 SCREENING, ANEMIA, DEFICIENCY, IRON: ICD-10-CM

## 2020-12-15 DIAGNOSIS — Z00.00 ROUTINE HISTORY AND PHYSICAL EXAMINATION OF ADULT: Primary | ICD-10-CM

## 2020-12-15 DIAGNOSIS — E55.9 VITAMIN D DEFICIENCY: ICD-10-CM

## 2020-12-15 DIAGNOSIS — Z13.220 LIPID SCREENING: ICD-10-CM

## 2020-12-15 PROCEDURE — 3074F SYST BP LT 130 MM HG: CPT | Performed by: FAMILY MEDICINE

## 2020-12-15 PROCEDURE — 80061 LIPID PANEL: CPT | Performed by: FAMILY MEDICINE

## 2020-12-15 PROCEDURE — 86780 TREPONEMA PALLIDUM: CPT | Performed by: FAMILY MEDICINE

## 2020-12-15 PROCEDURE — 82306 VITAMIN D 25 HYDROXY: CPT | Performed by: FAMILY MEDICINE

## 2020-12-15 PROCEDURE — 80050 GENERAL HEALTH PANEL: CPT | Performed by: FAMILY MEDICINE

## 2020-12-15 PROCEDURE — 87624 HPV HI-RISK TYP POOLED RSLT: CPT | Performed by: FAMILY MEDICINE

## 2020-12-15 PROCEDURE — 86769 SARS-COV-2 COVID-19 ANTIBODY: CPT | Performed by: FAMILY MEDICINE

## 2020-12-15 PROCEDURE — 87591 N.GONORRHOEAE DNA AMP PROB: CPT | Performed by: FAMILY MEDICINE

## 2020-12-15 PROCEDURE — 36415 COLL VENOUS BLD VENIPUNCTURE: CPT | Performed by: FAMILY MEDICINE

## 2020-12-15 PROCEDURE — 83036 HEMOGLOBIN GLYCOSYLATED A1C: CPT | Performed by: FAMILY MEDICINE

## 2020-12-15 PROCEDURE — 88175 CYTOPATH C/V AUTO FLUID REDO: CPT | Performed by: FAMILY MEDICINE

## 2020-12-15 PROCEDURE — 87389 HIV-1 AG W/HIV-1&-2 AB AG IA: CPT | Performed by: FAMILY MEDICINE

## 2020-12-15 PROCEDURE — 3079F DIAST BP 80-89 MM HG: CPT | Performed by: FAMILY MEDICINE

## 2020-12-15 PROCEDURE — 87625 HPV TYPES 16 & 18 ONLY: CPT | Performed by: FAMILY MEDICINE

## 2020-12-15 PROCEDURE — 87491 CHLMYD TRACH DNA AMP PROBE: CPT | Performed by: FAMILY MEDICINE

## 2020-12-15 PROCEDURE — 99396 PREV VISIT EST AGE 40-64: CPT | Performed by: FAMILY MEDICINE

## 2020-12-15 PROCEDURE — 3008F BODY MASS INDEX DOCD: CPT | Performed by: FAMILY MEDICINE

## 2020-12-15 PROCEDURE — 86803 HEPATITIS C AB TEST: CPT | Performed by: FAMILY MEDICINE

## 2020-12-15 RX ORDER — DEXTROAMPHETAMINE SACCHARATE, AMPHETAMINE ASPARTATE, DEXTROAMPHETAMINE SULFATE AND AMPHETAMINE SULFATE 2.5; 2.5; 2.5; 2.5 MG/1; MG/1; MG/1; MG/1
10 TABLET ORAL DAILY
Qty: 30 TABLET | Refills: 0 | Status: SHIPPED | OUTPATIENT
Start: 2020-12-15 | End: 2022-02-02

## 2020-12-15 RX ORDER — DEXTROAMPHETAMINE SACCHARATE, AMPHETAMINE ASPARTATE, DEXTROAMPHETAMINE SULFATE AND AMPHETAMINE SULFATE 2.5; 2.5; 2.5; 2.5 MG/1; MG/1; MG/1; MG/1
10 TABLET ORAL DAILY
Qty: 30 TABLET | Refills: 0 | Status: SHIPPED | OUTPATIENT
Start: 2021-01-12 | End: 2021-02-11

## 2020-12-15 RX ORDER — DEXTROAMPHETAMINE SACCHARATE, AMPHETAMINE ASPARTATE, DEXTROAMPHETAMINE SULFATE AND AMPHETAMINE SULFATE 2.5; 2.5; 2.5; 2.5 MG/1; MG/1; MG/1; MG/1
10 TABLET ORAL DAILY
Qty: 30 TABLET | Refills: 0 | Status: SHIPPED | OUTPATIENT
Start: 2021-02-09 | End: 2021-03-11

## 2020-12-15 NOTE — PROGRESS NOTES
HPI:   Naveed Bradford is a 60year old female who presents for a complete physical exam.      Patient complains of nothing new, but would like to go back on ADHD meds.   Since having less structure in her life and job with the pandemic she is really Take 1 tablet (10 mg total) by mouth daily. Around 2-3pm 30 tablet 0   • [START ON 1/12/2021] amphetamine-dextroamphetamine (ADDERALL) 10 MG Oral Tab Take 1 tablet (10 mg total) by mouth daily.  Around 2-3pm 30 tablet 0   • [START ON 2/9/2021] amphetamine-d pain or ST  LUNGS: denies shortness of breath with exertion, no chronic cough  CARDIOVASCULAR: denies chest pain on exertion, no heart palps, no edema  GI: denies abdominal pain,denies heartburn, no nausea, no changes in BMs, no blood in stool  : denies include 150min of low to moderate intensity exercise/week minimum. Healthy lifestyle also includes not smoking, sleeping 7-9hours/night, limiting alcohol to 0-1drinks/day for women, 0-2/day for men.   -declines shingrix today, will consider in future   - Future    Thyroid disorder screen  -     VENIPUNCTURE  -     TSH W REFLEX TO FREE T4; Future    Suspected COVID-19 virus infection  Comments:  Remote last winter; patient aware if ab negative doesn't rule out having had infxn  Orders:  -     SARS-COV-2 TOT

## 2021-01-07 ENCOUNTER — TELEPHONE (OUTPATIENT)
Dept: OTOLARYNGOLOGY | Age: 56
End: 2021-01-07

## 2021-02-10 ENCOUNTER — TELEPHONE (OUTPATIENT)
Dept: FAMILY MEDICINE CLINIC | Facility: CLINIC | Age: 56
End: 2021-02-10

## 2021-02-10 NOTE — TELEPHONE ENCOUNTER
Called the pt and scheduled her for colposcopy  Future Appointments   Date Time Provider Gisel Atwood   2/26/2021 11:00 AM Yan Gonzales MD [facility] NICOLETTE Mathews

## 2021-02-10 NOTE — TELEPHONE ENCOUNTER
Results and recommendations from [facility]:    Viewed by Kristine Durham on 12/23/2020  4:20 PM  Written by Lenny Vela MD on 12/23/2020  4:19 PM  Hernandez Shell-     Your PAP smear is normal in that the cells are normal, which is great.  However, you did t

## 2021-03-19 ENCOUNTER — MED REC SCAN ONLY (OUTPATIENT)
Dept: FAMILY MEDICINE CLINIC | Facility: CLINIC | Age: 56
End: 2021-03-19

## 2021-03-19 NOTE — PROGRESS NOTES
Judith Yung is a 60year old female. HPI:   Pt is here for a colposcopy.      Indication: normal PAP bu HR HPV + (type 16)  Prior colposcopy: no  Pregnancy test: neg  Iodine allergy: no  Vinegar allergy: no    Pt has no concerns prior to this co colposcope; SCJ visualized; aceitic acid applied to cervix; no acetowhite areas    EXAM:   Pulse 72   Temp 99.1 °F (37.3 °C) (Temporal)   Resp 16   Wt 189 lb (85.7 kg)   SpO2 99%   BMI 35.71 kg/m²   GENERAL: well developed, well nourished,in no apparent di

## 2021-05-05 ENCOUNTER — HOSPITAL ENCOUNTER (OUTPATIENT)
Age: 56
Discharge: HOME | End: 2021-05-05
Payer: COMMERCIAL

## 2021-05-05 ENCOUNTER — APPOINTMENT (OUTPATIENT)
Dept: CT IMAGING | Age: 56
End: 2021-05-05
Attending: PHYSICIAN ASSISTANT
Payer: COMMERCIAL

## 2021-05-05 ENCOUNTER — APPOINTMENT (OUTPATIENT)
Dept: ULTRASOUND IMAGING | Age: 56
End: 2021-05-05
Attending: PHYSICIAN ASSISTANT
Payer: COMMERCIAL

## 2021-05-05 VITALS
TEMPERATURE: 97 F | SYSTOLIC BLOOD PRESSURE: 144 MMHG | DIASTOLIC BLOOD PRESSURE: 80 MMHG | RESPIRATION RATE: 16 BRPM | HEART RATE: 70 BPM | OXYGEN SATURATION: 98 %

## 2021-05-05 DIAGNOSIS — R10.9 RIGHT FLANK PAIN: Primary | ICD-10-CM

## 2021-05-05 DIAGNOSIS — K80.50 BILIARY COLIC: ICD-10-CM

## 2021-05-05 PROCEDURE — 80047 BASIC METABLC PNL IONIZED CA: CPT | Performed by: PHYSICIAN ASSISTANT

## 2021-05-05 PROCEDURE — 83690 ASSAY OF LIPASE: CPT | Performed by: PHYSICIAN ASSISTANT

## 2021-05-05 PROCEDURE — 87086 URINE CULTURE/COLONY COUNT: CPT | Performed by: PHYSICIAN ASSISTANT

## 2021-05-05 PROCEDURE — 76700 US EXAM ABDOM COMPLETE: CPT | Performed by: PHYSICIAN ASSISTANT

## 2021-05-05 PROCEDURE — 96365 THER/PROPH/DIAG IV INF INIT: CPT | Performed by: PHYSICIAN ASSISTANT

## 2021-05-05 PROCEDURE — 74176 CT ABD & PELVIS W/O CONTRAST: CPT | Performed by: PHYSICIAN ASSISTANT

## 2021-05-05 PROCEDURE — 80076 HEPATIC FUNCTION PANEL: CPT | Performed by: PHYSICIAN ASSISTANT

## 2021-05-05 PROCEDURE — 81002 URINALYSIS NONAUTO W/O SCOPE: CPT | Performed by: PHYSICIAN ASSISTANT

## 2021-05-05 PROCEDURE — 85025 COMPLETE CBC W/AUTO DIFF WBC: CPT | Performed by: PHYSICIAN ASSISTANT

## 2021-05-05 PROCEDURE — 99214 OFFICE O/P EST MOD 30 MIN: CPT | Performed by: PHYSICIAN ASSISTANT

## 2021-05-05 RX ORDER — DEXTROAMPHETAMINE SACCHARATE, AMPHETAMINE ASPARTATE MONOHYDRATE, DEXTROAMPHETAMINE SULFATE AND AMPHETAMINE SULFATE 2.5; 2.5; 2.5; 2.5 MG/1; MG/1; MG/1; MG/1
10 CAPSULE, EXTENDED RELEASE ORAL EVERY MORNING
COMMUNITY
End: 2021-08-30 | Stop reason: ALTCHOICE

## 2021-05-05 RX ORDER — SODIUM CHLORIDE 9 MG/ML
INJECTION, SOLUTION INTRAVENOUS ONCE
Status: COMPLETED | OUTPATIENT
Start: 2021-05-05 | End: 2021-05-05

## 2021-05-05 RX ORDER — KETOROLAC TROMETHAMINE 30 MG/ML
30 INJECTION, SOLUTION INTRAMUSCULAR; INTRAVENOUS ONCE
Status: COMPLETED | OUTPATIENT
Start: 2021-05-05 | End: 2021-05-05

## 2021-05-05 NOTE — ED INITIAL ASSESSMENT (HPI)
Pt with c/o right flank/abdominal pain that started yesterday. Pt states pain was intermittent yesterday. Pt taking ibuprofen.

## 2021-05-05 NOTE — ED PROVIDER NOTES
Patient Seen in: [facility] [address] [facility]      History   Patient presents with:  Abdomen/Flank Pain    Stated Complaint: pain in right side     HPI/Subjective:   HPI    Very pleasant 61-year-old female. Medical history of ADHD and 3 previous C-sections.   Elvis Crooks significant CVA tenderness  Abdominal: Soft exam without distention. Pain to palpation in the right upper and right lower quadrant. Left upper and left lower benign. Skin: No sign of trauma, Skin warm and dry, no induration or sign of infection.    Neuro by (CST): Francesca Bear DO on 5/05/2021 at 11:25 AM     Finalized by (CST): Francesca Bear DO on 5/05/2021 at 11:29 AM       CT ABDOMEN+PELVIS [facility] 2D RNDR(NO IV,NO ORAL)(CPT=74176)    Result Date: 5/5/2021  PROCEDURE:  CT ABDOMEN+PELVIS [facility] calcification. 2. Suggestion of cystitis, correlate clinically.    Dictated by (CST): Denis Awad MD on 5/05/2021 at 9:43 AM     Finalized by (CST): Denis Awad MD on 5/05/2021 at 9:46 AM            MDM        This case was discussed with my supervising physi Impression:  Right flank pain  (primary encounter diagnosis)  Biliary colic     Disposition:  Discharge  5/5/2021 11:38 am    Follow-up:  Mat Matamoros MD  [address] [place] [address],[address] [id number]                Medications Prescribed:  Cur

## 2021-07-04 ENCOUNTER — HOSPITAL ENCOUNTER (OUTPATIENT)
Age: 56
Discharge: HOME | End: 2021-07-04
Payer: COMMERCIAL

## 2021-07-04 VITALS
RESPIRATION RATE: 16 BRPM | TEMPERATURE: 98 F | DIASTOLIC BLOOD PRESSURE: 80 MMHG | SYSTOLIC BLOOD PRESSURE: 132 MMHG | OXYGEN SATURATION: 97 % | HEART RATE: 75 BPM

## 2021-07-04 DIAGNOSIS — B34.9 VIRAL SYNDROME: Primary | ICD-10-CM

## 2021-07-04 LAB — S PYO AG THROAT QL: NEGATIVE

## 2021-07-04 PROCEDURE — 99213 OFFICE O/P EST LOW 20 MIN: CPT | Performed by: PHYSICIAN ASSISTANT

## 2021-07-04 PROCEDURE — 87880 STREP A ASSAY W/OPTIC: CPT | Performed by: PHYSICIAN ASSISTANT

## 2021-07-04 NOTE — ED PROVIDER NOTES
Patient Seen in: [facility]      History   Patient presents with:  Sore Throat  Fatigue    Stated Complaint: sore throat,blisters on fingers,headache,fatigue,rash    HPI/Subjective:   HPI    CHIEF COMPLAINT: URI, rash     HISTORY OF PRESENT [place] Tobacco Use      Smoking status: Never Smoker      Smokeless tobacco: Never Used      Tobacco comment: Non-smoker    Alcohol use: Yes      Comment: 2-4 servings/week    Drug use:  No             Review of Systems    Positive for stated complaint: sore thro bilateral thighs today. Rash appears urticarial in nature. For now she can try an oral antihistamine. As far as her URI symptoms go they appear to be mild in nature. She had a rapid strep that was negative.   Her vital signs are stable and she is nontox

## 2021-07-04 NOTE — ED INITIAL ASSESSMENT (HPI)
Pt reports a sore throat, fatigue and headache for a week. 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

## 2021-07-19 RX ORDER — ALBUTEROL SULFATE 90 UG/1
2 AEROSOL, METERED RESPIRATORY (INHALATION) EVERY 4 HOURS PRN
Qty: 1 EACH | Refills: 1 | Status: SHIPPED | OUTPATIENT
Start: 2021-07-19 | End: 2022-02-02

## 2021-07-19 NOTE — TELEPHONE ENCOUNTER
LOV 03/19/2021    Last refill on 03/19/2021, for #30 caps, with 0 refills  Lisdexamfetamine Dimesylate (VYVANSE) 20 MG Oral Cap    Last refill on 03/19/2021, for #1 inhaler, with 1 refills  Albuterol Sulfate  (90 Base) MCG/ACT Inhalation Aero Soln

## 2021-08-30 NOTE — TELEPHONE ENCOUNTER
LOV 03/19/2021    Last refill on 07/19/2021, for #30 caps, with 0 refills  Lisdexamfetamine Dimesylate (VYVANSE) 20 MG Oral Cap    No future appointments. Order(s) pending, please review. Thank you.

## 2021-11-11 ENCOUNTER — TELEPHONE (OUTPATIENT)
Dept: FAMILY MEDICINE CLINIC | Facility: CLINIC | Age: 56
End: 2021-11-11

## 2021-11-11 ENCOUNTER — OFFICE VISIT (OUTPATIENT)
Dept: FAMILY MEDICINE CLINIC | Facility: CLINIC | Age: 56
End: 2021-11-11
Payer: COMMERCIAL

## 2021-11-11 VITALS
BODY MASS INDEX: 35.5 KG/M2 | SYSTOLIC BLOOD PRESSURE: 148 MMHG | TEMPERATURE: 98 F | RESPIRATION RATE: 16 BRPM | DIASTOLIC BLOOD PRESSURE: 80 MMHG | HEART RATE: 103 BPM | HEIGHT: 61 IN | WEIGHT: 188 LBS | OXYGEN SATURATION: 98 %

## 2021-11-11 DIAGNOSIS — F41.8 SITUATIONAL ANXIETY: ICD-10-CM

## 2021-11-11 DIAGNOSIS — F43.9 STRESS: ICD-10-CM

## 2021-11-11 DIAGNOSIS — F90.2 ADHD (ATTENTION DEFICIT HYPERACTIVITY DISORDER), COMBINED TYPE: ICD-10-CM

## 2021-11-11 DIAGNOSIS — N89.8 VAGINAL ODOR: ICD-10-CM

## 2021-11-11 DIAGNOSIS — N89.8 VAGINAL ITCHING: Primary | ICD-10-CM

## 2021-11-11 PROCEDURE — 99214 OFFICE O/P EST MOD 30 MIN: CPT | Performed by: NURSE PRACTITIONER

## 2021-11-11 PROCEDURE — 3079F DIAST BP 80-89 MM HG: CPT | Performed by: NURSE PRACTITIONER

## 2021-11-11 PROCEDURE — 87510 GARDNER VAG DNA DIR PROBE: CPT | Performed by: NURSE PRACTITIONER

## 2021-11-11 PROCEDURE — 87660 TRICHOMONAS VAGIN DIR PROBE: CPT | Performed by: NURSE PRACTITIONER

## 2021-11-11 PROCEDURE — 87480 CANDIDA DNA DIR PROBE: CPT | Performed by: NURSE PRACTITIONER

## 2021-11-11 PROCEDURE — 3008F BODY MASS INDEX DOCD: CPT | Performed by: NURSE PRACTITIONER

## 2021-11-11 PROCEDURE — 3077F SYST BP >= 140 MM HG: CPT | Performed by: NURSE PRACTITIONER

## 2021-11-11 RX ORDER — ALPRAZOLAM 0.5 MG/1
0.5 TABLET, ORALLY DISINTEGRATING ORAL NIGHTLY PRN
Qty: 30 TABLET | Refills: 0 | Status: SHIPPED | OUTPATIENT
Start: 2021-11-11 | End: 2022-02-02

## 2021-11-11 NOTE — PROGRESS NOTES
Subjective:   Patient ID: Cherylene Honey is a 60year old female. Patient presents clinic today for medication follow-up and with a vaginal concern. She is experiencing redness/irritation and itchiness to vaginal area.   No abnormal discharge tablet by mouth daily. • TURMERIC CURCUMIN OR Take by mouth.      • Cholecalciferol (VITAMIN D3) 5000 UNITS Oral Cap Take 5000 iu daily 1 capsule 0     Allergies:  Levaquin [Levofloxa*    OTHER (SEE COMMENTS)    Comment:Tendon issues  Pcn [Penicillins] MG Oral Tablet Dispersible 30 tablet 0     Sig: Take 1 tablet (0.5 mg total) by mouth nightly as needed for Anxiety.        Imaging & Referrals:  None

## 2021-11-11 NOTE — TELEPHONE ENCOUNTER
Received fax from [facility] regarding pt's breast mammo screening bilateral final results    Recommendation: 1 year screening mammogram. Screening Breast MRI is recommended at 6 month intervals w/ annual screening mammography    Stated \"patient has been or jonathon

## 2021-11-19 ENCOUNTER — WALK IN (OUTPATIENT)
Dept: URGENT CARE | Age: 56
End: 2021-11-19

## 2021-11-19 VITALS
TEMPERATURE: 98.5 F | RESPIRATION RATE: 16 BRPM | SYSTOLIC BLOOD PRESSURE: 138 MMHG | HEART RATE: 99 BPM | OXYGEN SATURATION: 100 % | DIASTOLIC BLOOD PRESSURE: 80 MMHG

## 2021-11-19 DIAGNOSIS — K12.0 APHTHOUS ULCER: Primary | ICD-10-CM

## 2021-11-19 DIAGNOSIS — K14.1 TONGUE, GEOGRAPHIC: ICD-10-CM

## 2021-11-19 DIAGNOSIS — J06.9 ACUTE UPPER RESPIRATORY INFECTION, UNSPECIFIED: ICD-10-CM

## 2021-11-19 PROCEDURE — 99214 OFFICE O/P EST MOD 30 MIN: CPT | Performed by: INTERNAL MEDICINE

## 2021-11-19 RX ORDER — ALPRAZOLAM 0.5 MG/1
0.5 TABLET, ORALLY DISINTEGRATING ORAL
COMMUNITY
Start: 2021-11-11

## 2021-11-19 RX ORDER — AZITHROMYCIN 250 MG/1
TABLET, FILM COATED ORAL
Qty: 6 TABLET | Refills: 0 | Status: SHIPPED | OUTPATIENT
Start: 2021-11-19 | End: 2021-11-24

## 2021-11-19 RX ORDER — LISDEXAMFETAMINE DIMESYLATE CAPSULES 20 MG/1
20 CAPSULE ORAL
COMMUNITY
Start: 2021-11-11

## 2021-11-19 RX ORDER — PREDNISONE 50 MG/1
50 TABLET ORAL DAILY
Qty: 3 TABLET | Refills: 0 | Status: SHIPPED | OUTPATIENT
Start: 2021-11-19 | End: 2021-11-22

## 2021-11-24 ENCOUNTER — OFFICE VISIT (OUTPATIENT)
Dept: FAMILY MEDICINE CLINIC | Facility: CLINIC | Age: 56
End: 2021-11-24
Payer: COMMERCIAL

## 2021-11-24 VITALS
OXYGEN SATURATION: 100 % | DIASTOLIC BLOOD PRESSURE: 84 MMHG | SYSTOLIC BLOOD PRESSURE: 120 MMHG | HEART RATE: 101 BPM | WEIGHT: 185.63 LBS | TEMPERATURE: 98 F | BODY MASS INDEX: 35 KG/M2

## 2021-11-24 DIAGNOSIS — R21 SKIN ERUPTION: ICD-10-CM

## 2021-11-24 DIAGNOSIS — N89.8 VAGINAL ITCHING: Primary | ICD-10-CM

## 2021-11-24 PROCEDURE — 3074F SYST BP LT 130 MM HG: CPT | Performed by: NURSE PRACTITIONER

## 2021-11-24 PROCEDURE — 87510 GARDNER VAG DNA DIR PROBE: CPT | Performed by: NURSE PRACTITIONER

## 2021-11-24 PROCEDURE — 87480 CANDIDA DNA DIR PROBE: CPT | Performed by: NURSE PRACTITIONER

## 2021-11-24 PROCEDURE — 3079F DIAST BP 80-89 MM HG: CPT | Performed by: NURSE PRACTITIONER

## 2021-11-24 PROCEDURE — 87660 TRICHOMONAS VAGIN DIR PROBE: CPT | Performed by: NURSE PRACTITIONER

## 2021-11-24 PROCEDURE — 99213 OFFICE O/P EST LOW 20 MIN: CPT | Performed by: NURSE PRACTITIONER

## 2021-11-24 RX ORDER — CLOTRIMAZOLE AND BETAMETHASONE DIPROPIONATE 10; .64 MG/G; MG/G
1 CREAM TOPICAL 2 TIMES DAILY PRN
Qty: 60 G | Refills: 0 | Status: SHIPPED | OUTPATIENT
Start: 2021-11-24

## 2021-11-24 NOTE — PROGRESS NOTES
Subjective:   Patient ID: Yobani Turner is a 60year old female. Patient presents to clinic today with continued vaginal itching and discomfort. Reports this been going on for several weeks now.   Started with a rash on left side of chest breas Multiple Vitamins-Minerals (MULTI-VITAMIN/MINERALS) Oral Tab Take 1 tablet by mouth daily. • TURMERIC CURCUMIN OR Take by mouth.      • Cholecalciferol (VITAMIN D3) 5000 UNITS Oral Cap Take 5000 iu daily 1 capsule 0   • fluconazole 150 MG Oral Tab Take She is agreeable to this plan and verbalized understanding.     Meds This Visit:  Requested Prescriptions     Signed Prescriptions Disp Refills   • clotrimazole-betamethasone 1-0.05 % External Cream 60 g 0     Sig: Apply 1 Application topically 2 (two) time

## 2021-11-25 RX ORDER — FLUCONAZOLE 150 MG/1
150 TABLET ORAL
Qty: 3 TABLET | Refills: 0 | Status: SHIPPED | OUTPATIENT
Start: 2021-11-25

## 2021-12-29 ENCOUNTER — TELEPHONE (OUTPATIENT)
Dept: FAMILY MEDICINE CLINIC | Facility: CLINIC | Age: 56
End: 2021-12-29

## 2021-12-29 NOTE — TELEPHONE ENCOUNTER
Discussed with Dr. Kole Degroot regarding note below - please schedule video visit    Patient advised of note above.    Pt reports she took at home covid test - negative  Pt reports she completed 2 dose covid vaccine in February - has NOT gotten booster at this

## 2021-12-29 NOTE — TELEPHONE ENCOUNTER
Pt called would like an pt soon states has a cough,body aches, fever of 102.2 did a covid test 12/28 and came back negative     Pt call back # [phone]    Thank you

## 2021-12-30 ENCOUNTER — APPOINTMENT (OUTPATIENT)
Dept: GENERAL RADIOLOGY | Age: 56
End: 2021-12-30
Attending: NURSE PRACTITIONER
Payer: COMMERCIAL

## 2021-12-30 ENCOUNTER — HOSPITAL ENCOUNTER (OUTPATIENT)
Age: 56
Discharge: HOME | End: 2021-12-30
Payer: COMMERCIAL

## 2021-12-30 VITALS
DIASTOLIC BLOOD PRESSURE: 85 MMHG | TEMPERATURE: 98 F | RESPIRATION RATE: 16 BRPM | HEART RATE: 72 BPM | OXYGEN SATURATION: 97 % | SYSTOLIC BLOOD PRESSURE: 134 MMHG

## 2021-12-30 DIAGNOSIS — R50.9 FEVER, UNSPECIFIED FEVER CAUSE: Primary | ICD-10-CM

## 2021-12-30 DIAGNOSIS — J06.9 VIRAL URI WITH COUGH: ICD-10-CM

## 2021-12-30 LAB
POCT INFLUENZA A: NEGATIVE
POCT INFLUENZA B: NEGATIVE

## 2021-12-30 PROCEDURE — 99213 OFFICE O/P EST LOW 20 MIN: CPT | Performed by: NURSE PRACTITIONER

## 2021-12-30 PROCEDURE — 87502 INFLUENZA DNA AMP PROBE: CPT | Performed by: NURSE PRACTITIONER

## 2021-12-30 PROCEDURE — 71046 X-RAY EXAM CHEST 2 VIEWS: CPT | Performed by: NURSE PRACTITIONER

## 2021-12-30 NOTE — ED INITIAL ASSESSMENT (HPI)
Patient c/o cough, headache, and fevers since Monday. Took a at home test Tuesday and was negative. Here for covid testing.

## 2021-12-30 NOTE — ED PROVIDER NOTES
Patient Seen in: Immediate [address] [place] [address]      History   Patient presents with:  Fever  Cough/URI    Stated Complaint: cough and fever    Subjective:   HPI    CHIEF COMPLAINT:   Patient presents with:  Fever  Cough/URI      HPI:   Any Thomas is capsule 0      Past Medical History:   Diagnosis Date   • ADHD       Past Surgical History:   Procedure Laterality Date   •       x3   • COLONOSCOPY N/A 2020    Procedure: COLONOSCOPY, POSSIBLE BIOPSY, POSSIBLE POLYPECTOMY 23819;  Surgeon:  Anupam Mccarthy patient. Suspect viral etiology as no bacterial focus noted on exam.  Pt has reassuring physical exam consistent with viral URI. Lunges clear, no distress, respirations even and unlabored. We discussed covid-19 vs other viral etiologies.   Symptomatic care

## 2022-01-02 LAB — SARS-COV-2 RNA RESP QL NAA+PROBE: DETECTED

## 2022-01-29 NOTE — TELEPHONE ENCOUNTER
Routing to provider per protocol. Vyvanse 20 mg last refilled on 11/11/21 for # 30 with 0 rf. Adderall 10 mg last refilled on 2/9/21 for # 30 with 0 rf. Last seen on 3/19/21 for ADHD. No future appointments. Thank you.

## 2022-01-29 NOTE — TELEPHONE ENCOUNTER
Patient called requesting refill for:      Lisdexamfetamine Dimesylate (VYVANSE) 20 MG Oral Cap    amphetamine-dextroamphetamine (ADDERALL) 10 MG Oral Tab     [id number] - [place], [place] - [address] AT [address] [place] [address],

## 2022-01-30 RX ORDER — DEXTROAMPHETAMINE SACCHARATE, AMPHETAMINE ASPARTATE, DEXTROAMPHETAMINE SULFATE AND AMPHETAMINE SULFATE 2.5; 2.5; 2.5; 2.5 MG/1; MG/1; MG/1; MG/1
10 TABLET ORAL DAILY
Qty: 30 TABLET | Refills: 0 | OUTPATIENT
Start: 2022-01-30 | End: 2022-03-01

## 2022-01-31 NOTE — TELEPHONE ENCOUNTER
Refusing Rx. Will have to her establish care in-person prior to refilling these prescriptions.  Thank you. ~ MM

## 2022-01-31 NOTE — TELEPHONE ENCOUNTER
Future Appointments   Date Time Provider Gisel Atwood   2/2/2022  4:00 PM Rosalina Tenorio MD [facility] NICOLETTE Montes

## 2022-02-24 NOTE — TELEPHONE ENCOUNTER
Patient calling back regarding below -   Fwd to triage to assist    Patient returned call and states the addition of adderall is working very well.     fyi Dr Tiffany Mercado

## 2022-03-07 ENCOUNTER — APPOINTMENT (OUTPATIENT)
Dept: CT IMAGING | Age: 57
End: 2022-03-07
Attending: NURSE PRACTITIONER
Payer: COMMERCIAL

## 2022-03-07 ENCOUNTER — HOSPITAL ENCOUNTER (OUTPATIENT)
Age: 57
Discharge: HOME | End: 2022-03-07
Payer: COMMERCIAL

## 2022-03-07 ENCOUNTER — TELEPHONE (OUTPATIENT)
Dept: FAMILY MEDICINE CLINIC | Facility: CLINIC | Age: 57
End: 2022-03-07

## 2022-03-07 VITALS
WEIGHT: 192 LBS | OXYGEN SATURATION: 97 % | TEMPERATURE: 98 F | SYSTOLIC BLOOD PRESSURE: 140 MMHG | HEIGHT: 61 IN | BODY MASS INDEX: 36.25 KG/M2 | RESPIRATION RATE: 16 BRPM | DIASTOLIC BLOOD PRESSURE: 72 MMHG | HEART RATE: 69 BPM

## 2022-03-07 DIAGNOSIS — R10.9 ABDOMINAL PAIN, ACUTE: ICD-10-CM

## 2022-03-07 DIAGNOSIS — R82.998 LEUKOCYTES IN URINE: Primary | ICD-10-CM

## 2022-03-07 DIAGNOSIS — K57.92 ACUTE DIVERTICULITIS: ICD-10-CM

## 2022-03-07 LAB
#MXD IC: 0.5 X10ˆ3/UL (ref 0.1–1)
BUN BLD-MCNC: 14 MG/DL (ref 7–18)
CHLORIDE BLD-SCNC: 106 MMOL/L (ref 98–112)
CO2 BLD-SCNC: 27 MMOL/L (ref 21–32)
CREAT BLD-MCNC: 0.7 MG/DL
GLUCOSE BLD-MCNC: 104 MG/DL (ref 70–99)
HCT VFR BLD AUTO: 40.4 %
HCT VFR BLD CALC: 39 %
HGB BLD-MCNC: 13.5 G/DL
ISTAT IONIZED CALCIUM FOR CHEM 8: 1.29 MMOL/L (ref 1.12–1.32)
LYMPHOCYTES # BLD AUTO: 2.4 X10ˆ3/UL (ref 1–4)
LYMPHOCYTES NFR BLD AUTO: 34.6 %
MCH RBC QN AUTO: 31 PG (ref 26–34)
MCHC RBC AUTO-ENTMCNC: 33.4 G/DL (ref 31–37)
MCV RBC AUTO: 92.7 FL (ref 80–100)
MIXED CELL %: 6.8 %
NEUTROPHILS # BLD AUTO: 4.1 X10ˆ3/UL (ref 1.5–7.7)
NEUTROPHILS NFR BLD AUTO: 58.6 %
PLATELET # BLD AUTO: 344 X10ˆ3/UL (ref 150–450)
POCT BILIRUBIN URINE: NEGATIVE
POCT BLOOD URINE: NEGATIVE
POCT GLUCOSE URINE: NEGATIVE MG/DL
POCT KETONE URINE: NEGATIVE MG/DL
POCT NITRITE URINE: NEGATIVE
POCT PH URINE: 7.5 (ref 5–8)
POCT PROTEIN URINE: NEGATIVE MG/DL
POCT SPECIFIC GRAVITY URINE: 1.02
POCT URINE CLARITY: CLEAR
POCT URINE COLOR: YELLOW
POCT UROBILINOGEN URINE: 0.2 MG/DL
POTASSIUM BLD-SCNC: 4.1 MMOL/L (ref 3.6–5.1)
RBC # BLD AUTO: 4.36 X10ˆ6/UL
SODIUM BLD-SCNC: 142 MMOL/L (ref 136–145)
WBC # BLD AUTO: 7 X10ˆ3/UL (ref 4–11)

## 2022-03-07 PROCEDURE — 87086 URINE CULTURE/COLONY COUNT: CPT | Performed by: NURSE PRACTITIONER

## 2022-03-07 PROCEDURE — 85025 COMPLETE CBC W/AUTO DIFF WBC: CPT | Performed by: NURSE PRACTITIONER

## 2022-03-07 PROCEDURE — 80047 BASIC METABLC PNL IONIZED CA: CPT | Performed by: NURSE PRACTITIONER

## 2022-03-07 PROCEDURE — 81002 URINALYSIS NONAUTO W/O SCOPE: CPT | Performed by: NURSE PRACTITIONER

## 2022-03-07 PROCEDURE — 99214 OFFICE O/P EST MOD 30 MIN: CPT | Performed by: NURSE PRACTITIONER

## 2022-03-07 PROCEDURE — 74177 CT ABD & PELVIS W/CONTRAST: CPT | Performed by: NURSE PRACTITIONER

## 2022-03-07 RX ORDER — AMOXICILLIN AND CLAVULANATE POTASSIUM 875; 125 MG/1; MG/1
1 TABLET, FILM COATED ORAL 2 TIMES DAILY
Qty: 20 TABLET | Refills: 0 | Status: SHIPPED | OUTPATIENT
Start: 2022-03-07 | End: 2022-03-17

## 2022-03-07 RX ORDER — DICYCLOMINE HCL 20 MG
20 TABLET ORAL 4 TIMES DAILY PRN
Qty: 30 TABLET | Refills: 0 | Status: SHIPPED | OUTPATIENT
Start: 2022-03-07 | End: 2022-04-06

## 2022-03-07 RX ORDER — PREDNISONE 20 MG/1
40 TABLET ORAL DAILY
Qty: 10 TABLET | Refills: 0 | Status: SHIPPED | OUTPATIENT
Start: 2022-03-07 | End: 2022-03-12

## 2022-03-07 RX ORDER — ONDANSETRON 4 MG/1
4 TABLET, ORALLY DISINTEGRATING ORAL EVERY 4 HOURS PRN
Qty: 10 TABLET | Refills: 0 | Status: SHIPPED | OUTPATIENT
Start: 2022-03-07 | End: 2022-03-14

## 2022-03-07 RX ORDER — SODIUM CHLORIDE 9 MG/ML
1000 INJECTION, SOLUTION INTRAVENOUS ONCE
Status: COMPLETED | OUTPATIENT
Start: 2022-03-07 | End: 2022-03-07

## 2022-03-07 NOTE — TELEPHONE ENCOUNTER
Patient advised of Doctor's note below and to call office back for follow-up. Patient verbalized understanding. No further questions at this time.

## 2022-03-07 NOTE — TELEPHONE ENCOUNTER
Pt c/o pain to lower belly button, underneath it    Past couple days w/ low grade temps 100-99    Today no temp - Has taken aleve only    Poor appetite yesterday - Almost threw up - nauseous    Reports regular bowel movement    Every now and then sharp pain - lasts seconds then goes away -  Last night - c/o a lot of pain    This morning - had sharp pain for seconds only  This morning is feeling a bit better    Concerned if she has a stomach \"bug\" or if it is something else.   Looking for recommendations    Please advise, thank you

## 2022-03-07 NOTE — ED INITIAL ASSESSMENT (HPI)
Low abd pain over the weekend with a low grade temp and some nausea. No radiation of pain. Some constipation. No diarrhea. This am pain in around the umbilical area.

## 2022-03-07 NOTE — TELEPHONE ENCOUNTER
Patient states she has had pain at belly button all weekend with fever.  Would like appt    Please adv    Thank you

## 2022-03-07 NOTE — TELEPHONE ENCOUNTER
Please have patient seek care in IC/ER to rule out other possibilities such as an early appendicitis / diverticulitis / UTI etc.. Avoid eating or drinking anything until this has been ruled out.

## 2022-05-02 NOTE — TELEPHONE ENCOUNTER
Will need to see her this month. I generally do make dosing changes without having seen the patient. Needs to schedule OV / VV prior to her next refill.

## 2022-05-02 NOTE — TELEPHONE ENCOUNTER
LOV  2/22/22  LRF  4/5/22   No future appointments. I need a refill on my vyvanse. I am currently on 20 mg but I would like to go back up to 40 mg of vyvanse. With all the stuff going on in my life it is harder to focus. I was previously on 40 mg and decided to go down to 20 mg. Thank you.     Notes from Shawn marquita 4/21/22      I pended the higher dose- she is due for follow up this month as well

## 2022-07-19 ENCOUNTER — OFFICE VISIT (OUTPATIENT)
Dept: OTOLARYNGOLOGY | Age: 57
End: 2022-07-19

## 2022-07-19 DIAGNOSIS — R51.9 PRESSURE AND PAIN OF RIGHT SIDE OF FACE: ICD-10-CM

## 2022-07-19 DIAGNOSIS — H95.191 ENCOUNTER FOR MASTOIDECTOMY CAVITY DEBRIDEMENT, RIGHT: Primary | ICD-10-CM

## 2022-07-19 PROCEDURE — 99213 OFFICE O/P EST LOW 20 MIN: CPT | Performed by: PHYSICIAN ASSISTANT

## 2022-07-19 PROCEDURE — 31231 NASAL ENDOSCOPY DX: CPT | Performed by: PHYSICIAN ASSISTANT

## 2022-07-19 PROCEDURE — 69220 CLEAN OUT MASTOID CAVITY: CPT | Performed by: PHYSICIAN ASSISTANT

## 2022-08-14 ENCOUNTER — HOSPITAL ENCOUNTER (OUTPATIENT)
Age: 57
Discharge: HOME | End: 2022-08-14
Payer: COMMERCIAL

## 2022-08-14 VITALS
RESPIRATION RATE: 18 BRPM | TEMPERATURE: 98 F | DIASTOLIC BLOOD PRESSURE: 79 MMHG | HEART RATE: 62 BPM | OXYGEN SATURATION: 98 % | SYSTOLIC BLOOD PRESSURE: 156 MMHG

## 2022-08-14 DIAGNOSIS — L25.9 CONTACT DERMATITIS, UNSPECIFIED CONTACT DERMATITIS TYPE, UNSPECIFIED TRIGGER: Primary | ICD-10-CM

## 2022-08-14 PROCEDURE — 99213 OFFICE O/P EST LOW 20 MIN: CPT | Performed by: NURSE PRACTITIONER

## 2022-08-14 RX ORDER — PREDNISONE 20 MG/1
40 TABLET ORAL DAILY
Qty: 10 TABLET | Refills: 0 | Status: SHIPPED | OUTPATIENT
Start: 2022-08-14 | End: 2022-08-19

## 2022-11-22 ENCOUNTER — OFFICE VISIT (OUTPATIENT)
Dept: FAMILY MEDICINE CLINIC | Facility: CLINIC | Age: 57
End: 2022-11-22
Payer: COMMERCIAL

## 2022-11-22 VITALS
DIASTOLIC BLOOD PRESSURE: 82 MMHG | HEART RATE: 85 BPM | WEIGHT: 206 LBS | TEMPERATURE: 98 F | HEIGHT: 61 IN | OXYGEN SATURATION: 95 % | BODY MASS INDEX: 38.89 KG/M2 | SYSTOLIC BLOOD PRESSURE: 134 MMHG

## 2022-11-22 DIAGNOSIS — Z51.81 MEDICATION MONITORING ENCOUNTER: ICD-10-CM

## 2022-11-22 DIAGNOSIS — F90.2 ADHD (ATTENTION DEFICIT HYPERACTIVITY DISORDER), COMBINED TYPE: ICD-10-CM

## 2022-11-22 DIAGNOSIS — Z87.42 HISTORY OF VAGINITIS: Primary | ICD-10-CM

## 2022-11-22 DIAGNOSIS — E55.9 VITAMIN D DEFICIENCY: ICD-10-CM

## 2022-11-22 PROCEDURE — 3075F SYST BP GE 130 - 139MM HG: CPT | Performed by: FAMILY MEDICINE

## 2022-11-22 PROCEDURE — 99213 OFFICE O/P EST LOW 20 MIN: CPT | Performed by: FAMILY MEDICINE

## 2022-11-22 PROCEDURE — 3079F DIAST BP 80-89 MM HG: CPT | Performed by: FAMILY MEDICINE

## 2022-11-22 PROCEDURE — 3008F BODY MASS INDEX DOCD: CPT | Performed by: FAMILY MEDICINE

## 2022-11-22 RX ORDER — CLOTRIMAZOLE AND BETAMETHASONE DIPROPIONATE 10; .64 MG/G; MG/G
1 CREAM TOPICAL 2 TIMES DAILY PRN
Qty: 60 G | Refills: 1 | Status: SHIPPED | OUTPATIENT
Start: 2022-11-22 | End: 2022-12-22

## 2022-12-21 ENCOUNTER — PATIENT MESSAGE (OUTPATIENT)
Dept: FAMILY MEDICINE CLINIC | Facility: CLINIC | Age: 57
End: 2022-12-21

## 2022-12-22 NOTE — TELEPHONE ENCOUNTER
From: Tish Jasso  To: Rosalina Frey MD  Sent: 12/21/2022 7:50 PM CST  Subject: Vyvanse refill    I would like to increase my Vyvanse from 20 mg to 30. I have not refilled my prescription as of yet. Thank you.      Len Weber

## 2022-12-22 NOTE — TELEPHONE ENCOUNTER
Rosalina Amezcua MD [place], Mydhili Nurse 15 minutes ago (11:03 AM)     Switched to Vyvanse 30 mg. Please call pharmacy and cancel / confirm that Rx 20 mg have not been picked up.          [address] [place] [address] #[phone]  Confirmed Rx for 20 mg CANCELLED and that pt last picked up Rx on 11/23/2022    [facility] sent to pt

## 2023-01-02 ENCOUNTER — HOSPITAL ENCOUNTER (OUTPATIENT)
Age: 58
Discharge: HOME | End: 2023-01-02
Payer: COMMERCIAL

## 2023-01-02 VITALS
RESPIRATION RATE: 18 BRPM | BODY MASS INDEX: 37.38 KG/M2 | TEMPERATURE: 98 F | HEIGHT: 61 IN | WEIGHT: 198 LBS | SYSTOLIC BLOOD PRESSURE: 104 MMHG | DIASTOLIC BLOOD PRESSURE: 78 MMHG | HEART RATE: 73 BPM | OXYGEN SATURATION: 98 %

## 2023-01-02 DIAGNOSIS — H65.01 RIGHT ACUTE SEROUS OTITIS MEDIA, RECURRENCE NOT SPECIFIED: Primary | ICD-10-CM

## 2023-01-02 PROCEDURE — 99213 OFFICE O/P EST LOW 20 MIN: CPT | Performed by: NURSE PRACTITIONER

## 2023-01-02 RX ORDER — CLINDAMYCIN HYDROCHLORIDE 300 MG/1
300 CAPSULE ORAL 3 TIMES DAILY
Qty: 21 CAPSULE | Refills: 0 | Status: SHIPPED | OUTPATIENT
Start: 2023-01-02 | End: 2023-01-09

## 2023-01-02 NOTE — ED INITIAL ASSESSMENT (HPI)
Pt with two weeks of sinus congestions and drainage, which has improved. However patient states pain to right ear and face, worse with chewing.

## 2023-01-23 ENCOUNTER — PATIENT MESSAGE (OUTPATIENT)
Dept: FAMILY MEDICINE CLINIC | Facility: CLINIC | Age: 58
End: 2023-01-23

## 2023-01-23 ENCOUNTER — HOSPITAL ENCOUNTER (OUTPATIENT)
Age: 58
Discharge: HOME | End: 2023-01-23
Payer: COMMERCIAL

## 2023-01-23 VITALS
HEART RATE: 69 BPM | SYSTOLIC BLOOD PRESSURE: 163 MMHG | RESPIRATION RATE: 18 BRPM | DIASTOLIC BLOOD PRESSURE: 87 MMHG | OXYGEN SATURATION: 99 % | TEMPERATURE: 98 F

## 2023-01-23 DIAGNOSIS — J01.00 ACUTE NON-RECURRENT MAXILLARY SINUSITIS: Primary | ICD-10-CM

## 2023-01-23 PROCEDURE — 99213 OFFICE O/P EST LOW 20 MIN: CPT | Performed by: NURSE PRACTITIONER

## 2023-01-23 RX ORDER — DOXYCYCLINE HYCLATE 100 MG/1
100 CAPSULE ORAL 2 TIMES DAILY
Qty: 14 CAPSULE | Refills: 0 | Status: SHIPPED | OUTPATIENT
Start: 2023-01-23 | End: 2023-01-30

## 2023-01-23 RX ORDER — METHYLPREDNISOLONE 4 MG/1
TABLET ORAL
Qty: 1 EACH | Refills: 0 | Status: SHIPPED | OUTPATIENT
Start: 2023-01-23

## 2023-01-23 NOTE — TELEPHONE ENCOUNTER
Sent to pharmacy as: Lisdexamfetamine Dimesylate 30 MG Oral Capsule (Vyvanse)    Earliest Fill Date: 1/21/2023    E-Prescribing Status: Receipt confirmed by pharmacy (12/22/2022 11:03 AM CST)        Denied - DUPLICATE

## 2023-01-24 NOTE — ED INITIAL ASSESSMENT (HPI)
Pt with right ear pain x 1 month. Pt had appt follow up for tomorrow that was cancelled. Pt has had worsening pain that has started to effect her balance and increased sinus pressure.

## 2023-01-24 NOTE — TELEPHONE ENCOUNTER
From: Lizet Carpenter  To: Mydhili Claudetta Newton, MD  Sent: 1/23/2023 5:38 PM CST  Subject: Prescription denied    I sent a request to have my byvanse refilled since the bottle says no refills. It looks like it was denied. I was just in November for my prescription visit. Not understanding why this was denied.  Thank you

## 2023-03-02 ENCOUNTER — WALK IN (OUTPATIENT)
Dept: URGENT CARE | Age: 58
End: 2023-03-02

## 2023-03-02 VITALS
OXYGEN SATURATION: 99 % | SYSTOLIC BLOOD PRESSURE: 130 MMHG | HEART RATE: 92 BPM | RESPIRATION RATE: 18 BRPM | DIASTOLIC BLOOD PRESSURE: 70 MMHG | TEMPERATURE: 97.9 F

## 2023-03-02 DIAGNOSIS — J02.9 SORE THROAT: Primary | ICD-10-CM

## 2023-03-02 DIAGNOSIS — J32.9 SINUSITIS, UNSPECIFIED CHRONICITY, UNSPECIFIED LOCATION: ICD-10-CM

## 2023-03-02 LAB
INTERNAL PROCEDURAL CONTROLS ACCEPTABLE: YES
S PYO AG THROAT QL IA.RAPID: NEGATIVE
TEST LOT EXPIRATION DATE: NORMAL
TEST LOT NUMBER: NORMAL

## 2023-03-02 PROCEDURE — 99214 OFFICE O/P EST MOD 30 MIN: CPT | Performed by: FAMILY MEDICINE

## 2023-03-02 PROCEDURE — 87880 STREP A ASSAY W/OPTIC: CPT | Performed by: FAMILY MEDICINE

## 2023-03-02 RX ORDER — DOXYCYCLINE HYCLATE 100 MG
100 TABLET ORAL 2 TIMES DAILY
Qty: 20 TABLET | Refills: 0 | Status: SHIPPED | OUTPATIENT
Start: 2023-03-02 | End: 2023-03-12

## 2023-03-02 RX ORDER — PREDNISONE 20 MG/1
40 TABLET ORAL DAILY
Qty: 14 TABLET | Refills: 0 | Status: SHIPPED | OUTPATIENT
Start: 2023-03-02 | End: 2023-03-09

## 2023-06-30 ENCOUNTER — APPOINTMENT (OUTPATIENT)
Dept: GENERAL RADIOLOGY | Age: 58
End: 2023-06-30
Attending: NURSE PRACTITIONER
Payer: COMMERCIAL

## 2023-06-30 ENCOUNTER — HOSPITAL ENCOUNTER (OUTPATIENT)
Age: 58
Discharge: HOME | End: 2023-06-30
Payer: COMMERCIAL

## 2023-06-30 VITALS
HEART RATE: 70 BPM | OXYGEN SATURATION: 97 % | TEMPERATURE: 98 F | HEIGHT: 61 IN | DIASTOLIC BLOOD PRESSURE: 81 MMHG | WEIGHT: 200 LBS | SYSTOLIC BLOOD PRESSURE: 174 MMHG | BODY MASS INDEX: 37.76 KG/M2 | RESPIRATION RATE: 20 BRPM

## 2023-06-30 DIAGNOSIS — Z20.822 EXPOSURE TO COVID-19 VIRUS: ICD-10-CM

## 2023-06-30 DIAGNOSIS — J06.9 UPPER RESPIRATORY INFECTION WITH COUGH AND CONGESTION: ICD-10-CM

## 2023-06-30 DIAGNOSIS — R05.9 COUGH: Primary | ICD-10-CM

## 2023-06-30 LAB — SARS-COV-2 RNA RESP QL NAA+PROBE: NOT DETECTED

## 2023-06-30 PROCEDURE — 71046 X-RAY EXAM CHEST 2 VIEWS: CPT | Performed by: NURSE PRACTITIONER

## 2023-06-30 PROCEDURE — U0002 COVID-19 LAB TEST NON-CDC: HCPCS | Performed by: NURSE PRACTITIONER

## 2023-06-30 PROCEDURE — 99213 OFFICE O/P EST LOW 20 MIN: CPT | Performed by: NURSE PRACTITIONER

## 2023-06-30 PROCEDURE — 94640 AIRWAY INHALATION TREATMENT: CPT | Performed by: NURSE PRACTITIONER

## 2023-06-30 RX ORDER — ALBUTEROL SULFATE 90 UG/1
2 AEROSOL, METERED RESPIRATORY (INHALATION) EVERY 4 HOURS PRN
Qty: 1 EACH | Refills: 0 | Status: SHIPPED | OUTPATIENT
Start: 2023-06-30 | End: 2023-07-30

## 2023-06-30 RX ORDER — AZELASTINE 1 MG/ML
2 SPRAY, METERED NASAL 2 TIMES DAILY
Qty: 30 ML | Refills: 0 | Status: SHIPPED | OUTPATIENT
Start: 2023-06-30

## 2023-06-30 RX ORDER — PREDNISONE 20 MG/1
60 TABLET ORAL ONCE
Status: COMPLETED | OUTPATIENT
Start: 2023-06-30 | End: 2023-06-30

## 2023-06-30 RX ORDER — IPRATROPIUM BROMIDE AND ALBUTEROL SULFATE 2.5; .5 MG/3ML; MG/3ML
3 SOLUTION RESPIRATORY (INHALATION) ONCE
Status: DISCONTINUED | OUTPATIENT
Start: 2023-06-30 | End: 2023-06-30

## 2023-06-30 RX ORDER — ALBUTEROL SULFATE 90 UG/1
8 AEROSOL, METERED RESPIRATORY (INHALATION) 4 TIMES DAILY
Status: DISCONTINUED | OUTPATIENT
Start: 2023-06-30 | End: 2023-06-30

## 2023-06-30 RX ORDER — PREDNISONE 20 MG/1
40 TABLET ORAL DAILY
Qty: 10 TABLET | Refills: 0 | Status: SHIPPED | OUTPATIENT
Start: 2023-07-01 | End: 2023-07-06

## 2023-06-30 NOTE — DISCHARGE INSTRUCTIONS
Take the medications as prescribed. Cool-mist humidifier in the same room. Hot steam showers. Steam inhalations. Over-the-counter Zyrtec or Claritin daily for the next 2 weeks. Over-the-counter Benadryl 25 to 50 mg at night as needed. Please read the attached discharge instructions. Go to your nearest ER for new or worsening symptoms.

## 2023-06-30 NOTE — ED INITIAL ASSESSMENT (HPI)
Patient c/o runny nose, headache, sore throat and cough for 4 days. States her boyfriend tested + for Covid.

## 2023-08-21 ENCOUNTER — PATIENT MESSAGE (OUTPATIENT)
Dept: INTERNAL MEDICINE CLINIC | Facility: CLINIC | Age: 58
End: 2023-08-21

## 2023-09-21 ENCOUNTER — OFFICE VISIT (OUTPATIENT)
Dept: OTOLARYNGOLOGY | Age: 58
End: 2023-09-21

## 2023-09-21 ENCOUNTER — OFFICE VISIT (OUTPATIENT)
Dept: AUDIOLOGY | Age: 58
End: 2023-09-21
Attending: PHYSICIAN ASSISTANT

## 2023-09-21 DIAGNOSIS — H90.3 SENSORINEURAL HEARING LOSS (SNHL) OF BOTH EARS: Primary | ICD-10-CM

## 2023-09-21 DIAGNOSIS — H90.6 MIXED CONDUCTIVE AND SENSORINEURAL HEARING LOSS OF BOTH EARS: Primary | ICD-10-CM

## 2023-09-21 DIAGNOSIS — H90.A31 MIXED CONDUCTIVE AND SENSORINEURAL HEARING LOSS OF RIGHT EAR WITH RESTRICTED HEARING OF LEFT EAR: ICD-10-CM

## 2023-09-21 DIAGNOSIS — Z90.89 HISTORY OF RIGHT MASTOIDECTOMY: ICD-10-CM

## 2023-09-21 PROCEDURE — 92567 TYMPANOMETRY: CPT | Performed by: AUDIOLOGIST

## 2023-09-21 PROCEDURE — 92557 COMPREHENSIVE HEARING TEST: CPT | Performed by: AUDIOLOGIST

## 2023-09-21 PROCEDURE — 69220 CLEAN OUT MASTOID CAVITY: CPT | Performed by: PHYSICIAN ASSISTANT

## 2023-09-21 PROCEDURE — 99213 OFFICE O/P EST LOW 20 MIN: CPT | Performed by: PHYSICIAN ASSISTANT

## 2023-09-22 ENCOUNTER — TELEPHONE (OUTPATIENT)
Dept: OTOLARYNGOLOGY | Age: 58
End: 2023-09-22

## 2023-11-13 ENCOUNTER — HOSPITAL ENCOUNTER (OUTPATIENT)
Age: 58
Discharge: HOME | End: 2023-11-13
Payer: COMMERCIAL

## 2023-11-13 VITALS
BODY MASS INDEX: 37.57 KG/M2 | TEMPERATURE: 98 F | WEIGHT: 199 LBS | HEART RATE: 93 BPM | HEIGHT: 61 IN | OXYGEN SATURATION: 97 % | DIASTOLIC BLOOD PRESSURE: 86 MMHG | RESPIRATION RATE: 16 BRPM | SYSTOLIC BLOOD PRESSURE: 149 MMHG

## 2023-11-13 DIAGNOSIS — H66.91 RIGHT OTITIS MEDIA, UNSPECIFIED OTITIS MEDIA TYPE: Primary | ICD-10-CM

## 2023-11-13 DIAGNOSIS — U07.1 COVID-19 VIRUS INFECTION: ICD-10-CM

## 2023-11-13 LAB
POCT INFLUENZA A: NEGATIVE
POCT INFLUENZA B: NEGATIVE
SARS-COV-2 RNA RESP QL NAA+PROBE: DETECTED

## 2023-11-13 PROCEDURE — 87502 INFLUENZA DNA AMP PROBE: CPT | Performed by: NURSE PRACTITIONER

## 2023-11-13 PROCEDURE — 99214 OFFICE O/P EST MOD 30 MIN: CPT | Performed by: NURSE PRACTITIONER

## 2023-11-13 PROCEDURE — U0002 COVID-19 LAB TEST NON-CDC: HCPCS | Performed by: NURSE PRACTITIONER

## 2023-11-13 RX ORDER — BENZONATATE 100 MG/1
100 CAPSULE ORAL 3 TIMES DAILY PRN
Qty: 30 CAPSULE | Refills: 0 | Status: SHIPPED | OUTPATIENT
Start: 2023-11-13 | End: 2023-12-13

## 2023-11-13 RX ORDER — CEFDINIR 300 MG/1
300 CAPSULE ORAL 2 TIMES DAILY
Qty: 20 CAPSULE | Refills: 0 | Status: SHIPPED | OUTPATIENT
Start: 2023-11-13 | End: 2023-11-23

## 2023-11-30 ENCOUNTER — TELEPHONE (OUTPATIENT)
Dept: FAMILY MEDICINE CLINIC | Facility: CLINIC | Age: 58
End: 2023-11-30

## 2023-11-30 NOTE — TELEPHONE ENCOUNTER
Left message to voicemail (per verbal release form consent, NO identifying message to confirm.)  Advised patient to call office back [phone] - or to refer to [facility].       [facility] sent to pt  Notify me if not read by 12/07/23

## 2023-11-30 NOTE — TELEPHONE ENCOUNTER
Received fax from [facility] regarding pt's mammogram screening - completed 11/20/23    [facility] updated    \"RECOMMENDATION: A diagnostic mammogram and breast ultrasound are   recommended for the right breast. Ultrasound could be performed first   given likelihood of cyst. Screening Breast MRI is recommended at 6 month   intervals with annual screening mammography.  \"    Dr. Lo Hu reviewed - please confirm pt to complete diagnostic mammogram and US

## 2023-12-05 ENCOUNTER — MED REC SCAN ONLY (OUTPATIENT)
Dept: FAMILY MEDICINE CLINIC | Facility: CLINIC | Age: 58
End: 2023-12-05

## 2023-12-20 ENCOUNTER — HOSPITAL ENCOUNTER (OUTPATIENT)
Age: 58
Discharge: HOME | End: 2023-12-20
Payer: COMMERCIAL

## 2023-12-20 VITALS
TEMPERATURE: 98 F | BODY MASS INDEX: 37.38 KG/M2 | HEIGHT: 61 IN | RESPIRATION RATE: 16 BRPM | HEART RATE: 70 BPM | SYSTOLIC BLOOD PRESSURE: 163 MMHG | DIASTOLIC BLOOD PRESSURE: 87 MMHG | WEIGHT: 198 LBS | OXYGEN SATURATION: 97 %

## 2023-12-20 DIAGNOSIS — J06.9 VIRAL URI: Primary | ICD-10-CM

## 2023-12-20 LAB
POCT INFLUENZA A: NEGATIVE
POCT INFLUENZA B: NEGATIVE
S PYO AG THROAT QL: NEGATIVE

## 2023-12-20 PROCEDURE — 99213 OFFICE O/P EST LOW 20 MIN: CPT | Performed by: NURSE PRACTITIONER

## 2023-12-20 PROCEDURE — 87880 STREP A ASSAY W/OPTIC: CPT | Performed by: NURSE PRACTITIONER

## 2023-12-20 PROCEDURE — 87502 INFLUENZA DNA AMP PROBE: CPT | Performed by: NURSE PRACTITIONER

## 2023-12-20 NOTE — DISCHARGE INSTRUCTIONS
Rest and drink plenty of fluids. This will help to thin the mucous in the back of your throat. Take Tylenol and/or ibuprofen as needed for pain or fever. Use Zyrtec, Claritin, or Allegra to help with nasal drainage. You can take this up to twice a day. You can also take Sudafed to help with sinus pressure or pain. Get the medication behind the pharmacy counter. Take Robitussin or Delsym as needed for cough. Follow up with your PCP in 5-7 days. Your symptoms should improve in the next 7-10 days; however, the cough can linger for much longer. Thank you for choosing [facility] for your care.

## 2023-12-26 ENCOUNTER — HOSPITAL ENCOUNTER (OUTPATIENT)
Age: 58
Discharge: HOME | End: 2023-12-26
Payer: COMMERCIAL

## 2023-12-26 VITALS
DIASTOLIC BLOOD PRESSURE: 92 MMHG | HEART RATE: 93 BPM | TEMPERATURE: 97 F | SYSTOLIC BLOOD PRESSURE: 144 MMHG | BODY MASS INDEX: 37.57 KG/M2 | HEIGHT: 61 IN | WEIGHT: 199 LBS | RESPIRATION RATE: 18 BRPM | OXYGEN SATURATION: 97 %

## 2023-12-26 DIAGNOSIS — B37.2 CANDIDAL INTERTRIGO: Primary | ICD-10-CM

## 2023-12-26 PROCEDURE — 99213 OFFICE O/P EST LOW 20 MIN: CPT | Performed by: PHYSICIAN ASSISTANT

## 2023-12-26 RX ORDER — KETOCONAZOLE 20 MG/G
2 CREAM TOPICAL DAILY
Qty: 30 G | Refills: 0 | Status: SHIPPED | OUTPATIENT
Start: 2023-12-26

## 2023-12-26 RX ORDER — CLOTRIMAZOLE AND BETAMETHASONE DIPROPIONATE 10; .64 MG/G; MG/G
1 CREAM TOPICAL 2 TIMES DAILY PRN
COMMUNITY
Start: 2022-11-22

## 2023-12-26 RX ORDER — ZINC SULFATE 50(220)MG
220 CAPSULE ORAL DAILY
COMMUNITY

## 2023-12-26 NOTE — ED INITIAL ASSESSMENT (HPI)
Patient has a rash to her left axilla. It is itchy. She has tried her prescription cream, hydrocortisone, and  started lotrimin OTC last night.

## 2023-12-31 ENCOUNTER — HOSPITAL ENCOUNTER (OUTPATIENT)
Age: 58
Discharge: HOME | End: 2023-12-31
Payer: COMMERCIAL

## 2023-12-31 VITALS
SYSTOLIC BLOOD PRESSURE: 152 MMHG | OXYGEN SATURATION: 98 % | HEART RATE: 79 BPM | TEMPERATURE: 98 F | DIASTOLIC BLOOD PRESSURE: 90 MMHG | RESPIRATION RATE: 18 BRPM

## 2023-12-31 DIAGNOSIS — B37.2 INTERTRIGINOUS CANDIDIASIS: Primary | ICD-10-CM

## 2023-12-31 DIAGNOSIS — L23.3 ALLERGIC CONTACT DERMATITIS DUE TO DRUGS IN CONTACT WITH SKIN: ICD-10-CM

## 2023-12-31 RX ORDER — HYDROXYZINE HYDROCHLORIDE 25 MG/1
TABLET, FILM COATED ORAL EVERY 6 HOURS PRN
Qty: 20 TABLET | Refills: 0 | Status: SHIPPED | OUTPATIENT
Start: 2023-12-31 | End: 2024-01-30

## 2023-12-31 RX ORDER — NYSTATIN 100000 U/G
1 CREAM TOPICAL 2 TIMES DAILY
Qty: 30 G | Refills: 0 | Status: SHIPPED | OUTPATIENT
Start: 2023-12-31

## 2023-12-31 NOTE — DISCHARGE INSTRUCTIONS
Stop the ketoconazole. Start using the nystatin cream as directed. Take hydroxyzine as needed for itching. Follow-up with dermatology. See your discharge paperwork for referral information. If you have new, changing or worsening symptoms return for reevaluation or go to the ER.

## 2023-12-31 NOTE — ED INITIAL ASSESSMENT (HPI)
Patient was treated here on 12/26/23 for a rash. States the rash is getting worse and spreading to her left arm and left abd.

## 2024-01-05 ENCOUNTER — OFFICE VISIT (OUTPATIENT)
Dept: FAMILY MEDICINE CLINIC | Facility: CLINIC | Age: 59
End: 2024-01-05
Payer: COMMERCIAL

## 2024-01-05 ENCOUNTER — LAB ENCOUNTER (OUTPATIENT)
Dept: LAB | Age: 59
End: 2024-01-05
Attending: NURSE PRACTITIONER
Payer: COMMERCIAL

## 2024-01-05 VITALS
BODY MASS INDEX: 38.89 KG/M2 | TEMPERATURE: 98 F | WEIGHT: 206 LBS | DIASTOLIC BLOOD PRESSURE: 80 MMHG | RESPIRATION RATE: 18 BRPM | HEART RATE: 77 BPM | OXYGEN SATURATION: 98 % | SYSTOLIC BLOOD PRESSURE: 112 MMHG | HEIGHT: 61 IN

## 2024-01-05 DIAGNOSIS — Z76.89 ENCOUNTER TO ESTABLISH CARE: Primary | ICD-10-CM

## 2024-01-05 DIAGNOSIS — B37.2 CUTANEOUS CANDIDIASIS: ICD-10-CM

## 2024-01-05 DIAGNOSIS — F90.2 ADHD (ATTENTION DEFICIT HYPERACTIVITY DISORDER), COMBINED TYPE: ICD-10-CM

## 2024-01-05 DIAGNOSIS — E55.9 VITAMIN D DEFICIENCY: ICD-10-CM

## 2024-01-05 DIAGNOSIS — Z00.00 GENERAL MEDICAL EXAM: ICD-10-CM

## 2024-01-05 DIAGNOSIS — R22.0 FACIAL SWELLING: ICD-10-CM

## 2024-01-05 DIAGNOSIS — Z12.4 CERVICAL CANCER SCREENING: ICD-10-CM

## 2024-01-05 LAB
ALBUMIN SERPL-MCNC: 3.5 G/DL (ref 3.4–5)
ALBUMIN/GLOB SERPL: 1.1 {RATIO} (ref 1–2)
ALP LIVER SERPL-CCNC: 99 U/L
ALT SERPL-CCNC: 58 U/L
ANION GAP SERPL CALC-SCNC: 5 MMOL/L (ref 0–18)
AST SERPL-CCNC: 30 U/L (ref 15–37)
BASOPHILS # BLD AUTO: 0.05 X10(3) UL (ref 0–0.2)
BASOPHILS NFR BLD AUTO: 0.6 %
BILIRUB SERPL-MCNC: 0.6 MG/DL (ref 0.1–2)
BUN BLD-MCNC: 15 MG/DL (ref 9–23)
CALCIUM BLD-MCNC: 9 MG/DL (ref 8.5–10.1)
CHLORIDE SERPL-SCNC: 108 MMOL/L (ref 98–112)
CHOLEST SERPL-MCNC: 243 MG/DL (ref ?–200)
CO2 SERPL-SCNC: 29 MMOL/L (ref 21–32)
CREAT BLD-MCNC: 0.64 MG/DL
EGFRCR SERPLBLD CKD-EPI 2021: 101 ML/MIN/1.73M2 (ref 60–?)
EOSINOPHIL # BLD AUTO: 0.17 X10(3) UL (ref 0–0.7)
EOSINOPHIL NFR BLD AUTO: 2.2 %
ERYTHROCYTE [DISTWIDTH] IN BLOOD BY AUTOMATED COUNT: 12.4 %
EST. AVERAGE GLUCOSE BLD GHB EST-MCNC: 111 MG/DL (ref 68–126)
FASTING PATIENT LIPID ANSWER: YES
FASTING STATUS PATIENT QL REPORTED: YES
GLOBULIN PLAS-MCNC: 3.3 G/DL (ref 2.8–4.4)
GLUCOSE BLD-MCNC: 102 MG/DL (ref 70–99)
HBA1C MFR BLD: 5.5 % (ref ?–5.7)
HCT VFR BLD AUTO: 45.9 %
HDLC SERPL-MCNC: 67 MG/DL (ref 40–59)
HGB BLD-MCNC: 15.6 G/DL
IMM GRANULOCYTES # BLD AUTO: 0.03 X10(3) UL (ref 0–1)
IMM GRANULOCYTES NFR BLD: 0.4 %
LDLC SERPL CALC-MCNC: 137 MG/DL (ref ?–100)
LYMPHOCYTES # BLD AUTO: 2.29 X10(3) UL (ref 1–4)
LYMPHOCYTES NFR BLD AUTO: 29.2 %
MCH RBC QN AUTO: 31 PG (ref 26–34)
MCHC RBC AUTO-ENTMCNC: 34 G/DL (ref 31–37)
MCV RBC AUTO: 91.3 FL
MONOCYTES # BLD AUTO: 0.46 X10(3) UL (ref 0.1–1)
MONOCYTES NFR BLD AUTO: 5.9 %
NEUTROPHILS # BLD AUTO: 4.84 X10 (3) UL (ref 1.5–7.7)
NEUTROPHILS # BLD AUTO: 4.84 X10(3) UL (ref 1.5–7.7)
NEUTROPHILS NFR BLD AUTO: 61.7 %
NONHDLC SERPL-MCNC: 176 MG/DL (ref ?–130)
OSMOLALITY SERPL CALC.SUM OF ELEC: 295 MOSM/KG (ref 275–295)
PLATELET # BLD AUTO: 340 10(3)UL (ref 150–450)
POTASSIUM SERPL-SCNC: 3.9 MMOL/L (ref 3.5–5.1)
PROT SERPL-MCNC: 6.8 G/DL (ref 6.4–8.2)
RBC # BLD AUTO: 5.03 X10(6)UL
SODIUM SERPL-SCNC: 142 MMOL/L (ref 136–145)
TRIGL SERPL-MCNC: 220 MG/DL (ref 30–149)
TSI SER-ACNC: 1.18 MIU/ML (ref 0.36–3.74)
VLDLC SERPL CALC-MCNC: 41 MG/DL (ref 0–30)
WBC # BLD AUTO: 7.8 X10(3) UL (ref 4–11)

## 2024-01-05 PROCEDURE — 84443 ASSAY THYROID STIM HORMONE: CPT

## 2024-01-05 PROCEDURE — 3079F DIAST BP 80-89 MM HG: CPT | Performed by: NURSE PRACTITIONER

## 2024-01-05 PROCEDURE — 83036 HEMOGLOBIN GLYCOSYLATED A1C: CPT

## 2024-01-05 PROCEDURE — 3008F BODY MASS INDEX DOCD: CPT | Performed by: NURSE PRACTITIONER

## 2024-01-05 PROCEDURE — 85025 COMPLETE CBC W/AUTO DIFF WBC: CPT

## 2024-01-05 PROCEDURE — 80053 COMPREHEN METABOLIC PANEL: CPT

## 2024-01-05 PROCEDURE — 3074F SYST BP LT 130 MM HG: CPT | Performed by: NURSE PRACTITIONER

## 2024-01-05 PROCEDURE — 99396 PREV VISIT EST AGE 40-64: CPT | Performed by: NURSE PRACTITIONER

## 2024-01-05 PROCEDURE — 80061 LIPID PANEL: CPT

## 2024-01-05 PROCEDURE — 99213 OFFICE O/P EST LOW 20 MIN: CPT | Performed by: NURSE PRACTITIONER

## 2024-01-05 RX ORDER — FLUCONAZOLE 100 MG/1
100 TABLET ORAL DAILY
Qty: 7 TABLET | Refills: 0 | Status: SHIPPED | OUTPATIENT
Start: 2024-01-05 | End: 2024-01-12

## 2024-01-05 RX ORDER — LISDEXAMFETAMINE DIMESYLATE CAPSULES 20 MG/1
20 CAPSULE ORAL DAILY
Qty: 30 CAPSULE | Refills: 0 | Status: SHIPPED | OUTPATIENT
Start: 2024-01-05 | End: 2024-02-04

## 2024-01-05 RX ORDER — LISDEXAMFETAMINE DIMESYLATE CAPSULES 20 MG/1
20 CAPSULE ORAL DAILY
Qty: 30 CAPSULE | Refills: 0 | Status: SHIPPED | OUTPATIENT
Start: 2024-02-05 | End: 2024-03-06

## 2024-01-05 RX ORDER — LISDEXAMFETAMINE DIMESYLATE CAPSULES 20 MG/1
20 CAPSULE ORAL DAILY
Qty: 30 CAPSULE | Refills: 0 | Status: SHIPPED | OUTPATIENT
Start: 2024-03-07 | End: 2024-04-06

## 2024-01-05 RX ORDER — PREDNISONE 20 MG/1
20 TABLET ORAL DAILY
Qty: 7 TABLET | Refills: 0 | Status: SHIPPED | OUTPATIENT
Start: 2024-01-05 | End: 2024-01-12

## 2024-01-05 NOTE — PROGRESS NOTES
HPI:   Patient is here for physical and ADHD follow up.    Current Dose: Has not been on Vyvanse since 2/2023  Med Side effects: some oral dryness  Home/school/work functioning: difficult to focus and stay on task. Feels very scattered. Wants to resume Vyvanse but wishes to decrease dose due to mouth dryness    Last Pap: 12/2020, normal with + HPV  Last Mammogram: 11/2023, needed ultrasound. That showed benign cyst. Will be having follow up ultrasound in [place]  Last DEXA: NA  Last Colon Cancer Screen: 12/2020, next needed in 5 years    Exercise: none.    Diet: doesn't watch  Occupation: does [employer]    Other Concerns:    -Has been to IC twice for fungal rash under left arm. It has gotten better with topical treatment but still persisting  -Woke up with swelling and irritation around her eyes. No drainage or eye redness. Has had this before. No new personal care products    Wt Readings from Last 6 Encounters:   01/05/24 206 lb (93.4 kg)   12/26/23 199 lb (90.3 kg)   12/20/23 198 lb (89.8 kg)   11/13/23 199 lb (90.3 kg)   06/30/23 200 lb (90.7 kg)   01/02/23 198 lb (89.8 kg)     Body mass index is 38.92 kg/m².     Cholesterol, Total (mg/dL)   Date Value   12/15/2020 218 (H)   02/12/2020 214 (H)   07/10/2018 235 (H)     HDL Cholesterol (mg/dL)   Date Value   12/15/2020 74 (H)   02/12/2020 80 (H)   07/10/2018 71     LDL Cholesterol (mg/dL)   Date Value   12/15/2020 122 (H)   02/12/2020 99   07/10/2018 139 (H)     AST (U/L)   Date Value   05/05/2021 11 (L)   12/15/2020 14 (L)   02/12/2020 21   07/10/2012 14 (L)     ALT (U/L)   Date Value   05/05/2021 25   12/15/2020 30   02/12/2020 30   07/10/2012 20        Current Outpatient Medications   Medication Sig Dispense Refill    lisdexamfetamine (VYVANSE) 20 MG Oral Cap Take 1 capsule (20 mg total) by mouth daily. 30 capsule 0    [START ON 2/5/2024] lisdexamfetamine (VYVANSE) 20 MG Oral Cap Take 1 capsule (20 mg total) by mouth daily. 30 capsule 0     [START ON 3/7/2024] lisdexamfetamine (VYVANSE) 20 MG Oral Cap Take 1 capsule (20 mg total) by mouth daily. 30 capsule 0    predniSONE 20 MG Oral Tab Take 1 tablet (20 mg total) by mouth daily for 7 days. 7 tablet 0    fluconazole 100 MG Oral Tab Take 1 tablet (100 mg total) by mouth daily for 7 days. 7 tablet 0    nystatin 100,000 Units/g External Cream Apply 1 Application topically 2 (two) times daily. 30 g 0    hydrOXYzine 25 MG Oral Tab Take 1-2 tablets (25-50 mg total) by mouth every 6 (six) hours as needed for Itching. 20 tablet 0    zinc sulfate 220 (50 Zn) MG Oral Cap Take 1 capsule (220 mg total) by mouth daily.      ketoconazole 2 % External Cream Apply 2 Applications topically daily. 30 g 0    azelastine 0.1 % Nasal Solution 2 sprays by Nasal route 2 (two) times daily. 30 mL 0    metRONIDAZOLE 0.75 % External Cream APPLY CREAM TO AFFECTED SKIN TOPICALLY BID 45 g 0    Multiple Vitamins-Minerals (MULTI-VITAMIN/MINERALS) Oral Tab Take 1 tablet by mouth daily.      TURMERIC CURCUMIN OR Take by mouth.      Cholecalciferol (VITAMIN D3) 5000 UNITS Oral Cap Take 5000 iu daily 1 capsule 0    clotrimazole-betamethasone 1-0.05 % External Cream Apply 1 Application topically 2 (two) times daily as needed. (Patient not taking: Reported on 2024)      albuterol 108 (90 Base) MCG/ACT Inhalation Aero Soln Inhale 2 puffs into the lungs every 4 (four) hours as needed. (Patient not taking: Reported on 2024) 1 each 1    B Complex Vitamins (B COMPLEX 100 OR)  (Patient not taking: Reported on 2024)        Past Medical History:   Diagnosis Date    ADHD       Past Surgical History:   Procedure Laterality Date          x3    COLONOSCOPY N/A 2020    Procedure: COLONOSCOPY, POSSIBLE BIOPSY, POSSIBLE POLYPECTOMY 47579;  Surgeon: Tor Bah DO;  Location: [facility]    MASTOIDECTOMY,RADICAL Right     MIDDLE EAR SURGERY PROC UNLISTED        Family History   Problem Relation Age of Onset    Breast  Cancer Mother     Cancer Mother         breast    Hypertension Father     Cancer Father         prostate    Other (Other) Father     Cancer Sister 51        breast-now cancer free    Other (Other) Sister     Other (Other) Sister         RA    Other (Other) Daughter         histiocytosis, migraines      Social History:   Social History     Socioeconomic History    Marital status:    Occupational History    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   HENT:  Positive for rhinorrhea (at times, mildy). Negative for congestion, ear pain, postnasal drip, sore throat and trouble swallowing.         + facial swelling   Eyes:  Negative for visual disturbance.        + eyelid swelling   Respiratory:  Negative for cough, chest tightness, shortness of breath and wheezing.    Cardiovascular:  Negative for chest pain and palpitations.   Gastrointestinal:  Negative for abdominal pain, blood in stool, constipation, diarrhea, nausea and vomiting.   Endocrine: Positive for polydipsia. Negative for cold intolerance, heat intolerance, polyphagia and polyuria.   Genitourinary:  Positive for dyspareunia and frequency (leaks). Negative for dysuria, hematuria, pelvic pain and vaginal bleeding.   Musculoskeletal:  Negative for back pain.   Skin:  Positive for rash.   Neurological:  Negative for headaches.   Hematological:  Does not bruise/bleed easily.   Psychiatric/Behavioral:  Positive for decreased concentration. Negative for dysphoric mood and sleep disturbance. The patient is not nervous/anxious.        EXAM:   /80   Pulse 77   Temp 97.9 °F (36.6 °C) (Temporal)    Resp 18   Ht 5' 1\" (1.549 m)   Wt 206 lb (93.4 kg)   SpO2 98%   BMI 38.92 kg/m²   Patient height not recorded   Physical Exam  Constitutional:       General: She is not in acute distress.     Appearance: Normal appearance. She is well-developed and well-groomed. She is obese.   HENT:      Head:        Right Ear: Tympanic membrane, ear canal and external ear normal.      Left Ear: Tympanic membrane, ear canal and external ear normal.      Nose: Nose normal.      Mouth/Throat:      Mouth: Mucous membranes are moist.      Pharynx: Oropharynx is clear.   Eyes:      General: Lids are normal. Vision grossly intact.         Right eye: No discharge.         Left eye: No discharge.      Conjunctiva/sclera: Conjunctivae normal.      Pupils: Pupils are equal, round, and reactive to light.   Neck:      Thyroid: No thyromegaly.   Cardiovascular:      Rate and Rhythm: Normal rate and regular rhythm.      Heart sounds: Normal heart sounds.   Pulmonary:      Effort: Pulmonary effort is normal.      Breath sounds: Normal breath sounds.   Abdominal:      General: Abdomen is flat. Bowel sounds are normal.      Palpations: Abdomen is soft.      Tenderness: There is no abdominal tenderness.   Musculoskeletal:         General: Normal range of motion.      Cervical back: Normal range of motion.   Skin:     Findings: Rash present.          Neurological:      General: No focal deficit present.      Mental Status: She is alert and oriented to person, place, and time.      Cranial Nerves: No cranial nerve deficit.   Psychiatric:         Mood and Affect: Mood normal.         ASSESSMENT AND PLAN:   Diagnoses and all orders for this visit:    Encounter to establish care    General medical exam  -     Hemoglobin A1C; Future  -     TSH W Reflex To Free T4; Future  -     Comp Metabolic Panel (14); Future  -     Lipid Panel; Future  -     CBC With Differential With Platelet; Future    ADHD (attention deficit hyperactivity disorder), combined  type  -     lisdexamfetamine (VYVANSE) 20 MG Oral Cap; Take 1 capsule (20 mg total) by mouth daily.  -     lisdexamfetamine (VYVANSE) 20 MG Oral Cap; Take 1 capsule (20 mg total) by mouth daily.  -     lisdexamfetamine (VYVANSE) 20 MG Oral Cap; Take 1 capsule (20 mg total) by mouth daily.    Vitamin D deficiency    Cervical cancer screening  -     OBG Referral - [facility])    Cutaneous candidiasis  -     Hemoglobin A1C; Future  -     predniSONE 20 MG Oral Tab; Take 1 tablet (20 mg total) by mouth daily for 7 days.  -     fluconazole 100 MG Oral Tab; Take 1 tablet (100 mg total) by mouth daily for 7 days.    Facial swelling  -     predniSONE 20 MG Oral Tab; Take 1 tablet (20 mg total) by mouth daily for 7 days.  -     fluconazole 100 MG Oral Tab; Take 1 tablet (100 mg total) by mouth daily for 7 days.    Fasting lab work today  Suspect elevated glucose with persistent candida   Start oral prednisone. Resume Zyrtec. If acutely worsening, go to ER  Call with update after resuming Vyvanse

## 2024-01-06 ENCOUNTER — TELEPHONE (OUTPATIENT)
Dept: FAMILY MEDICINE CLINIC | Facility: CLINIC | Age: 59
End: 2024-01-06

## 2024-01-06 DIAGNOSIS — E78.5 HYPERLIPIDEMIA, UNSPECIFIED HYPERLIPIDEMIA TYPE: Primary | ICD-10-CM

## 2024-01-06 NOTE — TELEPHONE ENCOUNTER
Patient advised and verbalized understanding  Patient would like to hold of on medication right now and would like to focus on lifestyle changes  Pt states the last 6 months have been very stressful for her.  Reminder and order placed to check cholesterol in 3 months  Heart scan: [phone]

## 2024-04-01 ENCOUNTER — TELEPHONE (OUTPATIENT)
Dept: FAMILY MEDICINE CLINIC | Facility: CLINIC | Age: 59
End: 2024-04-01

## 2024-04-05 ENCOUNTER — PATIENT MESSAGE (OUTPATIENT)
Dept: FAMILY MEDICINE CLINIC | Facility: CLINIC | Age: 59
End: 2024-04-05

## 2024-04-05 DIAGNOSIS — F90.2 ADHD (ATTENTION DEFICIT HYPERACTIVITY DISORDER), COMBINED TYPE: ICD-10-CM

## 2024-04-06 NOTE — TELEPHONE ENCOUNTER
From: Sumaya Castillo  To: Marilynn Hyde  Sent: 4/5/2024 3:27 PM CDT  Subject: Nystatin refill     The yeast Infection in my armpit has come back. I do not have any refills for the nystatin and wondering if I could get a refill for the cream sent to [facility] in [place]. Thank you.

## 2024-04-06 NOTE — TELEPHONE ENCOUNTER
lisdexamfetamine (VYVANSE) 20 MG Oral Cap [Marilynn Hyde]       Patient Comment: Would prefer to stay on 20 mg die to mouth dey ess     Last refill vyvanse 20mg was 3/7/24 #30 0 refill  Last OV 1/5/24  No future appointments.

## 2024-04-08 RX ORDER — LISDEXAMFETAMINE DIMESYLATE CAPSULES 20 MG/1
20 CAPSULE ORAL DAILY
Qty: 30 CAPSULE | Refills: 0 | Status: SHIPPED | OUTPATIENT
Start: 2024-04-08 | End: 2024-05-08

## 2024-05-31 ENCOUNTER — TELEPHONE (OUTPATIENT)
Dept: FAMILY MEDICINE CLINIC | Facility: CLINIC | Age: 59
End: 2024-05-31

## 2024-05-31 DIAGNOSIS — N60.01 CYST OF RIGHT BREAST: Primary | ICD-10-CM

## 2024-06-07 ENCOUNTER — APPOINTMENT (OUTPATIENT)
Dept: CT IMAGING | Age: 59
End: 2024-06-07
Attending: NURSE PRACTITIONER
Payer: COMMERCIAL

## 2024-06-07 ENCOUNTER — HOSPITAL ENCOUNTER (OUTPATIENT)
Age: 59
Discharge: HOME | End: 2024-06-07
Payer: COMMERCIAL

## 2024-06-07 VITALS
DIASTOLIC BLOOD PRESSURE: 63 MMHG | HEIGHT: 61 IN | HEART RATE: 93 BPM | BODY MASS INDEX: 38.33 KG/M2 | OXYGEN SATURATION: 97 % | WEIGHT: 203 LBS | TEMPERATURE: 98 F | RESPIRATION RATE: 18 BRPM | SYSTOLIC BLOOD PRESSURE: 122 MMHG

## 2024-06-07 DIAGNOSIS — R10.32 COLICKY LLQ ABDOMINAL PAIN: Primary | ICD-10-CM

## 2024-06-07 DIAGNOSIS — K57.92 ACUTE DIVERTICULITIS: ICD-10-CM

## 2024-06-07 LAB
#MXD IC: 0.8 X10ˆ3/UL (ref 0.1–1)
BILIRUB UR QL STRIP: NEGATIVE
BUN BLD-MCNC: 10 MG/DL (ref 7–18)
CHLORIDE BLD-SCNC: 103 MMOL/L (ref 98–112)
CLARITY UR: CLEAR
CO2 BLD-SCNC: 24 MMOL/L (ref 21–32)
COLOR UR: YELLOW
CREAT BLD-MCNC: 0.6 MG/DL
EGFRCR SERPLBLD CKD-EPI 2021: 103 ML/MIN/1.73M2 (ref 60–?)
GLUCOSE BLD-MCNC: 104 MG/DL (ref 70–99)
GLUCOSE UR STRIP-MCNC: NEGATIVE MG/DL
HCT VFR BLD AUTO: 42 %
HCT VFR BLD CALC: 41 %
HGB BLD-MCNC: 14.3 G/DL
HGB UR QL STRIP: NEGATIVE
ISTAT IONIZED CALCIUM FOR CHEM 8: 1.18 MMOL/L (ref 1.12–1.32)
KETONES UR STRIP-MCNC: NEGATIVE MG/DL
LYMPHOCYTES # BLD AUTO: 2.3 X10ˆ3/UL (ref 1–4)
LYMPHOCYTES NFR BLD AUTO: 19.9 %
MCH RBC QN AUTO: 30.7 PG (ref 26–34)
MCHC RBC AUTO-ENTMCNC: 34 G/DL (ref 31–37)
MCV RBC AUTO: 90.1 FL (ref 80–100)
MIXED CELL %: 7.4 %
NEUTROPHILS # BLD AUTO: 8.3 X10ˆ3/UL (ref 1.5–7.7)
NEUTROPHILS NFR BLD AUTO: 72.7 %
NITRITE UR QL STRIP: NEGATIVE
PH UR STRIP: 8.5 [PH]
PLATELET # BLD AUTO: 381 X10ˆ3/UL (ref 150–450)
POTASSIUM BLD-SCNC: 3.7 MMOL/L (ref 3.6–5.1)
PROT UR STRIP-MCNC: NEGATIVE MG/DL
RBC # BLD AUTO: 4.66 X10ˆ6/UL
SODIUM BLD-SCNC: 140 MMOL/L (ref 136–145)
SP GR UR STRIP: 1.02
UROBILINOGEN UR STRIP-ACNC: <2 MG/DL
WBC # BLD AUTO: 11.4 X10ˆ3/UL (ref 4–11)

## 2024-06-07 PROCEDURE — 99214 OFFICE O/P EST MOD 30 MIN: CPT | Performed by: NURSE PRACTITIONER

## 2024-06-07 PROCEDURE — 80047 BASIC METABLC PNL IONIZED CA: CPT | Performed by: NURSE PRACTITIONER

## 2024-06-07 PROCEDURE — 74177 CT ABD & PELVIS W/CONTRAST: CPT | Performed by: NURSE PRACTITIONER

## 2024-06-07 PROCEDURE — 85025 COMPLETE CBC W/AUTO DIFF WBC: CPT | Performed by: NURSE PRACTITIONER

## 2024-06-07 PROCEDURE — 81002 URINALYSIS NONAUTO W/O SCOPE: CPT | Performed by: NURSE PRACTITIONER

## 2024-06-07 RX ORDER — CEFPODOXIME PROXETIL 200 MG/1
400 TABLET, FILM COATED ORAL 2 TIMES DAILY
Qty: 20 TABLET | Refills: 0 | Status: SHIPPED | OUTPATIENT
Start: 2024-06-07 | End: 2024-06-12

## 2024-06-07 RX ORDER — METRONIDAZOLE 500 MG/1
500 TABLET ORAL 3 TIMES DAILY
Qty: 30 TABLET | Refills: 0 | Status: SHIPPED | OUTPATIENT
Start: 2024-06-07 | End: 2024-06-17

## 2024-06-07 RX ORDER — ONDANSETRON 4 MG/1
4 TABLET, ORALLY DISINTEGRATING ORAL EVERY 4 HOURS PRN
Qty: 10 TABLET | Refills: 0 | Status: SHIPPED | OUTPATIENT
Start: 2024-06-07 | End: 2024-06-14

## 2024-06-07 RX ORDER — DICYCLOMINE HCL 20 MG
20 TABLET ORAL 4 TIMES DAILY PRN
Qty: 30 TABLET | Refills: 0 | Status: SHIPPED | OUTPATIENT
Start: 2024-06-07 | End: 2024-07-07

## 2024-06-07 RX ORDER — SODIUM CHLORIDE 9 MG/ML
1000 INJECTION, SOLUTION INTRAVENOUS ONCE
Status: COMPLETED | OUTPATIENT
Start: 2024-06-07 | End: 2024-06-07

## 2024-06-07 NOTE — DISCHARGE INSTRUCTIONS
Follow-up with your primary care provider for all of your healthcare needs  Increase fluids keep well-hydrated  Recommend clear liquid diet for next 24 hours  Finish the full course of both antibiotics  Use the Bentyl for stomach cramping  Use the Zofran for nausea  Tylenol and Motrin for fever and pain  Return to the emergency room for symptoms or concerns

## 2024-06-07 NOTE — ED PROVIDER NOTES
Patient Seen in: [facility]      History     Chief Complaint   Patient presents with    Abdominal Pain     Stated Complaint: lower left abdominal pain/fever headache/nasuea sinus pressure and pain    Subjective:   HPI    58-year-old female presents to immediate care with complaints of left lower quadrant pain with low-grade fevers of Tmax 100.5 started last night.  Patient does have a have a history of diverticulitis but unsure when her last flareup was.  Is slightly nauseous but no vomiting or diarrhea no blood in the stool.  The patient has no other issues complaints or concerns.  The patient's medication list, past medical history and social history elements as listed in today's nurse's notes were reviewed and agreed (except as otherwise stated in the HPI).  The patient's family history reviewed and determined to be noncontributory to the presenting problem.      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   Tobacco Use    Smoking status: Never    Smokeless tobacco: Never    Tobacco comments:     Non-smoker   Vaping Use    Vaping status: Never Used   Substance and Sexual Activity    Alcohol use: Yes     Comment: 2-4 servings/week    Drug use: No     Social Determinants of Health      Received from [facility], [facility]    Social Connections    Received from [facility], [facility]    Housing Stability              Review of Systems    Positive for stated complaint: lower  left abdominal pain/fever headache/nasuea sinus pressure and pain  Other systems are as noted in HPI.  Constitutional and vital signs reviewed.      All other systems reviewed and negative except as noted above.    Physical Exam     ED Triage Vitals [06/07/24 1536]   BP (!) 127/97   Pulse 93   Resp 18   Temp 98.2 °F (36.8 °C)   Temp src Temporal   SpO2 97 %   O2 Device None (Room air)       Current Vitals:   Vital Signs  BP: (!) 127/97  Pulse: 93  Resp: 18  Temp: 98.2 °F (36.8 °C)  Temp src: Temporal    Oxygen Therapy  SpO2: 97 %  O2 Device: None (Room air)            Physical Exam    GENERAL: well developed, well nourished, in distress and in pain: no  SKIN: no rashes, no suspicious lesions  Head: Normocephalic and atraumatic   Eyes: PERRLA+, EOMI+.  Conjunctiva normal.  Cornea clear.  Ears: normal pending membranes.  Normal external auditory canals   Nose: Nasal mucosa.  No sinus tenderness.  No nasal congestion.    Throat: Oropharynx noninjected.  No lip, tongue, throat swelling. Buccal mucosa moist: no  EYES: PERRLA, EOMI, normal optic disk,conjunctiva are clear  NECK: supple, no adenopathy, no bruits  CHEST: no chest tenderness  LUNGS: clear to auscultation  CARDIO: RRR without murmur  GI: soft, non distended. No visible peristalsis. No masses. No organomegaly. Tenderness in left lower quadrant. Bowel sounds: Normal active x 4. Guarding : no. Rigidity: no.   NEURO: Oriented times three, cranial nerves are intact, motor and sensory are grossly intact'    ED Course     Labs Reviewed   POCT CBC - Abnormal; Notable for the following components:       Result Value    WBC IC 11.4 (*)     # Neutrophil 8.3 (*)     All other components within normal limits   EM POCT URINALYSIS DIPSTICK - Abnormal; Notable for the following components:    PH, Urine 8.5 (*)     Leukocyte esterase urine Trace (*)     All other components within normal limits   POCT ISTAT CHEM8 CARTRIDGE - Abnormal; Notable for the following components:     ISTAT Glucose 104 (*)     All other components within normal limits     CT ABDOMEN+PELVIS(CONTRAST ONLY)(CPT=74177)    Result Date: 6/7/2024  PROCEDURE:  CT ABDOMEN+PELVIS (CONTRAST ONLY) (CPT=74177)  COMPARISON:  [facility], CT, CT ABDOMEN+PELVIS(CONTRAST ONLY)(CPT=74177), 3/07/2022, 1:08 PM.  INDICATIONS:  lower left abdominal pain/fever headache/nasuea sinus pressure and pain  TECHNIQUE:  CT scanning was performed from the dome of the diaphragm to the pubic symphysis with non-ionic intravenous contrast material. Post contrast coronal MPR imaging was performed.  Dose reduction techniques were used. Dose information is transmitted to the ACR (American College of Radiology) NRDR (National Radiology Data Registry) which includes the Dose Index Registry.  PATIENT STATED HISTORY:(As transcribed by Technologist)  The patient has left lower quadrant pain with nausea.   CONTRAST USED:  100cc of Isovue 370  FINDINGS:  LIVER:  There are 3 focal low-attenuation foci in the liver.  These are either unchanged or smaller.  The largest is in segment 6, measuring 1.6 x 1.0 cm, remeasured in a similar manner at 1.7 x 0.9 cm. BILIARY:  Contracted gallbladder.  No biliary dilatation. PANCREAS:  Uniform parenchyma.  No ductal dilatation. SPLEEN:  Not enlarged. KIDNEYS:  Normal anatomic positions.  No hydronephrosis or perinephric stranding.  Uniform parenchymal enhancement. ADRENALS:  Not enlarged. AORTA/VASCULAR:  Smooth tapering.  No abdominal aortic aneurysm.  Patent celiac artery, SMA and ARAMIS.  Patent splenic vein and portal vein. RETROPERITONEUM:  No adenopathy. BOWEL/MESENTERY:  Normal bowel caliber.  There is wall thickening with pericolonic stranding centered at a diverticulum at the junction of the descending and sigmoid segments.  No free air or focal fluid collection.  Inflammatory changes are more proximal than on the prior. ABDOMINAL WALL:  Diastasis recti.  Additional fat containing umbilical hernia.  URINARY BLADDER:  Nondistended. PELVIC NODES:  None enlarged. PELVIC ORGANS:  No uterine or ovarian abnormality. BONES:  Mild to moderate degenerative changes.  Normal vertebral body heights without subluxation. LUNG BASES:  No consolidation or pleural effusion. OTHER:  None.             CONCLUSION:  1. Abnormal findings at the junction of the descending and sigmoid colons consistent with acute diverticulitis.  No evidence of perforation or abscess. 2. Low density foci in the liver are unchanged or slightly smaller, supporting benign foci. 3. Details as above.  Continued clinical correlation recommended.    LOCATION:  [facility]   Dictated by (CST): Fermín Benites MD on 6/07/2024 at 4:39 PM     Finalized by (CST): Fermín Benites MD on 6/07/2024 at 4:45 PM             MDM   Patient presents with LLQabdominal pain of nonemergent etiology.  No abdominal bruit, no relation to fatty meals, negative Hill's, no radiation to back, no CVA tenderness, no history of alcohol abuse,  no bloody stool.  Patient have fluctuance and normal bowel movements.  Patient nontoxic in appearance with normal vitals.  Unlikely AAA, cholecystitis, pancreatitis, small bowel obstruction, appendicitis, mesenteric ischemia, nephrolithiasis, phonophoresis, .  Will treat for diverticulitis on Vantin and Flagyl, Bentyl and Zofran patient tolerating p.o. and able to control pain with distraction and p.o. medication.  Plan DC home with return precautions.  Please note that this report has been produced using speech recognition software and may contain errors related to that system including, but not limited to, errors in grammar, punctuation, and spelling, as well as words and phrases that possibly may have been recognized inappropriately.  If there are any questions or concerns, contact the dictating provider for clarification.                                   Medical Decision Making      Disposition and Plan     Clinical Impression:  1. Colicky LLQ  abdominal pain    2. Acute diverticulitis         Disposition:  Discharge  6/7/2024  4:51 pm    Follow-up:  Miracle Hernandez MD  [address] [address]  [phone]                Medications Prescribed:  Current Discharge Medication List        START taking these medications    Details   cefpodoxime 200 MG Oral Tab Take 2 tablets (400 mg total) by mouth 2 (two) times daily for 5 days.  Qty: 20 tablet, Refills: 0      metRONIDAZOLE 500 MG Oral Tab Take 1 tablet (500 mg total) by mouth 3 (three) times daily for 10 days.  Qty: 30 tablet, Refills: 0      ondansetron 4 MG Oral Tablet Dispersible Take 1 tablet (4 mg total) by mouth every 4 (four) hours as needed for Nausea.  Qty: 10 tablet, Refills: 0      dicyclomine 20 MG Oral Tab Take 1 tablet (20 mg total) by mouth 4 (four) times daily as needed.  Qty: 30 tablet, Refills: 0

## 2024-06-07 NOTE — ED INITIAL ASSESSMENT (HPI)
Pt having left lower abd pain since last night, fever 100.5 last night, nausea.   Sharp constant pain.    Had 3 bm today.    Denies any pain on urination, frequent urination.

## 2024-06-18 ENCOUNTER — TELEPHONE (OUTPATIENT)
Dept: FAMILY MEDICINE CLINIC | Facility: CLINIC | Age: 59
End: 2024-06-18

## 2024-06-18 DIAGNOSIS — Z12.31 BREAST CANCER SCREENING BY MAMMOGRAM: Primary | ICD-10-CM

## 2024-06-18 DIAGNOSIS — N60.01 CYST OF RIGHT BREAST: ICD-10-CM

## 2024-06-18 NOTE — TELEPHONE ENCOUNTER
I got result of breast us diagnostic right done on 6/7/24  It looks like benign mass in right breast is stable.  Repeat nivia and breast us right in 6 month and they also recommend mri breast. I have placed orders.

## 2024-06-25 ENCOUNTER — OFFICE VISIT (OUTPATIENT)
Dept: OTOLARYNGOLOGY | Age: 59
End: 2024-06-25

## 2024-06-25 ENCOUNTER — OFFICE VISIT (OUTPATIENT)
Dept: AUDIOLOGY | Age: 59
End: 2024-06-25
Attending: OTOLARYNGOLOGY

## 2024-06-25 DIAGNOSIS — J01.41 ACUTE RECURRENT PANSINUSITIS: ICD-10-CM

## 2024-06-25 DIAGNOSIS — H92.01 RIGHT EAR PAIN: ICD-10-CM

## 2024-06-25 DIAGNOSIS — H91.90 HEARING LOSS, UNSPECIFIED HEARING LOSS TYPE, UNSPECIFIED LATERALITY: Primary | ICD-10-CM

## 2024-06-25 DIAGNOSIS — H90.A31 MIXED CONDUCTIVE AND SENSORINEURAL HEARING LOSS OF RIGHT EAR WITH RESTRICTED HEARING OF LEFT EAR: Primary | ICD-10-CM

## 2024-06-25 PROCEDURE — 92557 COMPREHENSIVE HEARING TEST: CPT | Performed by: AUDIOLOGIST

## 2024-06-25 PROCEDURE — 99213 OFFICE O/P EST LOW 20 MIN: CPT | Performed by: OTOLARYNGOLOGY

## 2024-06-25 RX ORDER — CEFPROZIL 500 MG/1
500 TABLET, FILM COATED ORAL 2 TIMES DAILY
Qty: 20 TABLET | Refills: 0 | Status: SHIPPED | OUTPATIENT
Start: 2024-06-25 | End: 2024-07-05

## 2024-06-25 RX ORDER — CIPROFLOXACIN AND DEXAMETHASONE 3; 1 MG/ML; MG/ML
6 SUSPENSION/ DROPS AURICULAR (OTIC) 2 TIMES DAILY
Qty: 1 EACH | Refills: 0 | Status: SHIPPED | OUTPATIENT
Start: 2024-06-25 | End: 2024-07-02

## 2024-10-02 ENCOUNTER — PATIENT MESSAGE (OUTPATIENT)
Dept: FAMILY MEDICINE CLINIC | Facility: CLINIC | Age: 59
End: 2024-10-02

## 2024-10-02 DIAGNOSIS — F90.2 ADHD (ATTENTION DEFICIT HYPERACTIVITY DISORDER), COMBINED TYPE: ICD-10-CM

## 2024-10-02 RX ORDER — LISDEXAMFETAMINE DIMESYLATE 20 MG/1
20 CAPSULE ORAL DAILY
Qty: 30 CAPSULE | Refills: 0 | Status: CANCELLED
Start: 2024-10-02 | End: 2024-11-01

## 2024-10-02 NOTE — TELEPHONE ENCOUNTER
No previous requests in Epic  Last office visit 1/5/24  Last refilled on 4/8/24 for # 30 with 0 refills  No future appointments.     Thank you.

## 2024-10-02 NOTE — TELEPHONE ENCOUNTER
From: Sumaya Castillo  To: Marilynn Hyde  Sent: 10/2/2024 3:09 AM CDT  Subject: Vyvanse refill    Requesting a refill for Vyvanse. I have tried 2 other times but no response. Thank you

## 2024-10-02 NOTE — TELEPHONE ENCOUNTER
From: Sumaya Castillo  To: Marilynn Hyde  Sent: 10/2/2024 11:08 AM CDT  Subject: Vyvanse refill    I have scheduled an appt for 10/30 @ 2:20 pm. Is it possible for me to get 1 refill until I the appt? Thanks

## 2024-11-14 ENCOUNTER — PATIENT MESSAGE (OUTPATIENT)
Dept: FAMILY MEDICINE CLINIC | Facility: CLINIC | Age: 59
End: 2024-11-14

## 2024-11-14 DIAGNOSIS — F90.2 ADHD (ATTENTION DEFICIT HYPERACTIVITY DISORDER), COMBINED TYPE: ICD-10-CM

## 2024-11-14 RX ORDER — LISDEXAMFETAMINE DIMESYLATE 20 MG/1
20 CAPSULE ORAL EVERY MORNING
Qty: 30 CAPSULE | Refills: 0 | Status: SHIPPED | OUTPATIENT
Start: 2024-11-14 | End: 2024-12-14

## 2024-11-14 NOTE — TELEPHONE ENCOUNTER
LOV: 10/11/24 for ADHD    lisdexamfetamine (VYVANSE) 20 MG Oral Cap ()  Take 1 capsule (20 mg total) by mouth every morning. Dispense: 30 capsule, Refills: 0 ordered   10/11/2024 11/10/2024        Future Appointments   Date Time Provider Department Center   2024 10:00 AM [facility] NURSE [facility]

## 2024-11-15 ENCOUNTER — TELEPHONE (OUTPATIENT)
Dept: FAMILY MEDICINE CLINIC | Facility: CLINIC | Age: 59
End: 2024-11-15

## 2024-12-09 ENCOUNTER — APPOINTMENT (OUTPATIENT)
Dept: CT IMAGING | Age: 59
End: 2024-12-09
Attending: NURSE PRACTITIONER
Payer: COMMERCIAL

## 2024-12-09 ENCOUNTER — APPOINTMENT (OUTPATIENT)
Dept: GENERAL RADIOLOGY | Age: 59
End: 2024-12-09
Attending: NURSE PRACTITIONER
Payer: COMMERCIAL

## 2024-12-09 ENCOUNTER — HOSPITAL ENCOUNTER (OUTPATIENT)
Age: 59
Discharge: HOME | End: 2024-12-09
Payer: COMMERCIAL

## 2024-12-09 VITALS
DIASTOLIC BLOOD PRESSURE: 94 MMHG | HEART RATE: 66 BPM | SYSTOLIC BLOOD PRESSURE: 160 MMHG | RESPIRATION RATE: 18 BRPM | TEMPERATURE: 98 F | OXYGEN SATURATION: 98 %

## 2024-12-09 DIAGNOSIS — S00.03XA HEMATOMA OF SCALP, INITIAL ENCOUNTER: ICD-10-CM

## 2024-12-09 DIAGNOSIS — S09.90XA CLOSED HEAD INJURY, INITIAL ENCOUNTER: Primary | ICD-10-CM

## 2024-12-09 DIAGNOSIS — S63.614A SPRAIN OF RIGHT RING FINGER, UNSPECIFIED SITE OF DIGIT, INITIAL ENCOUNTER: ICD-10-CM

## 2024-12-09 PROCEDURE — 70450 CT HEAD/BRAIN W/O DYE: CPT | Performed by: NURSE PRACTITIONER

## 2024-12-09 PROCEDURE — 99214 OFFICE O/P EST MOD 30 MIN: CPT | Performed by: NURSE PRACTITIONER

## 2024-12-09 PROCEDURE — A4570 SPLINT: HCPCS | Performed by: NURSE PRACTITIONER

## 2024-12-09 PROCEDURE — 73130 X-RAY EXAM OF HAND: CPT | Performed by: NURSE PRACTITIONER

## 2024-12-09 RX ORDER — IBUPROFEN 200 MG
200 TABLET ORAL EVERY 6 HOURS PRN
COMMUNITY

## 2024-12-09 NOTE — ED INITIAL ASSESSMENT (HPI)
Pt sts 3 days ago heel caught on a step, grabbed a handrail as she fell backwards and hit back of head on concrete, no LOC. Pain/swelling/bruising to right hand/4th finger. HA continues. Feeling tired.

## 2024-12-09 NOTE — ED PROVIDER NOTES
Patient Seen in: [facility]      History     Chief Complaint   Patient presents with    Head Injury    Arm or Hand Injury     Stated Complaint: fall-head and hand injury    Subjective:   HPI      Patient is a pleasant 59-year-old female with no significant past medical history, here for evaluation of close head injury and right fourth phalanx pain after fall.  Injury occurred 3 days ago.  Patient states her heel caught on the step which resulted in her falling backwards, hitting the back of her head on concrete.  There is no loss of consciousness.  Patient is not on blood thinners.  Patient had scalp hematoma after injury.  This has been improving/reducing in size.  Patient has experienced continued headaches and fatigue since injury.  Pain, swelling and bruising of the right hand/fourth finger.  Pain with movement.  Pain rated 7 out of 10 in severity.    Objective:     Past Medical History:    ADHD              Past Surgical History:   Procedure Laterality Date          x3    Colonoscopy N/A 2020    Procedure: COLONOSCOPY, POSSIBLE BIOPSY, POSSIBLE POLYPECTOMY 79761;  Surgeon: Tor Bah DO;  Location: [facility]    Mastoidectomy,radical Right     Middle ear surgery proc unlisted                  No pertinent social history.            Review of Systems    Positive for stated complaint: fall-head and hand injury  Other systems are as noted in HPI.  Constitutional and vital signs reviewed.      All other systems reviewed and negative except as noted above.    Physical Exam     ED Triage Vitals [24 1124]   BP (!) 160/94   Pulse 66   Resp 18   Temp 97.9 °F (36.6 °C)   Temp src Oral   SpO2 98 %   O2 Device None (Room air)       Current Vitals:   Vital Signs  BP: (!) 160/94  Pulse: 66  Resp: 18  Temp: 97.9 °F (36.6 °C)  Temp src: Oral    Oxygen Therapy  SpO2: 98 %  O2 Device: None (Room air)        Physical Exam  Vitals and nursing note reviewed.   Constitutional:        General: She is not in acute distress.     Appearance: Normal appearance. She is not ill-appearing, toxic-appearing or diaphoretic.          Comments: Erythemic contusion within occipital scalp region.  Skin is intact.   HENT:      Right Ear: Tympanic membrane and ear canal normal.      Left Ear: Tympanic membrane and ear canal normal.      Mouth/Throat:      Mouth: Mucous membranes are moist.   Eyes:      Conjunctiva/sclera: Conjunctivae normal.      Pupils: Pupils are equal, round, and reactive to light.   Cardiovascular:      Rate and Rhythm: Normal rate and regular rhythm.   Pulmonary:      Effort: Pulmonary effort is normal.   Musculoskeletal:      Right hand: Swelling, tenderness and bony tenderness present. Decreased range of motion. Normal strength. Normal sensation. There is no disruption of two-point discrimination. Normal capillary refill. Normal pulse.      Comments: Right fourth phalanx with edema and ecchymosis.  Pain at fourth metacarpal region and MCP joint.  Neurovascular status is intact   Neurological:      General: No focal deficit present.      Mental Status: She is alert and oriented to person, place, and time.      Cranial Nerves: Cranial nerves 2-12 are intact. No cranial nerve deficit or facial asymmetry.      Coordination: Coordination is intact.      Gait: Gait is intact.           ED Course   Labs Reviewed - No data to display  XR HAND (MIN 3 VIEWS), RIGHT (CPT=73130)    Result Date: 12/9/2024  CONCLUSION:  No acute disease.    LOCATION:  [facility]   Dictated by (CST): Kris Steve MD on 12/09/2024 at 12:06 PM     Finalized by (CST): Kris Steve MD on 12/09/2024 at 12:07 PM       CT BRAIN OR HEAD (CPT=70450)    Result Date: 12/9/2024  CONCLUSION:  No acute intracranial abnormality.    LOCATION:  [facility]   Dictated by (CST): Fermín Benites MD on 12/09/2024 at 12:01 PM     Finalized by (CST): Fermín Benites MD on 12/09/2024 at 12:02 PM            [facility]              Medical Decision  Making  Differentials include but are not limited to closed head injury, concussion, fracture, acute intracranial process, finger fracture, sprain and dislocation.  Head CT unremarkable-there is a noted scalp hematoma, this has been improving since injury per patient report.  Hand x-ray is unremarkable for acute findings which I personally reviewed, I do not observe obvious fracture or dislocation.  Finger splint applied here.  Close outpatient follow-up with PCP and Ortho as needed.  Patient agrees with plan of care.  All questions answered to patient's satisfaction    Amount and/or Complexity of Data Reviewed  Radiology: ordered and independent interpretation performed. Decision-making details documented in ED Course.        Disposition and Plan     Clinical Impression:  1. Closed head injury, initial encounter    2. Sprain of right ring finger, unspecified site of digit, initial encounter    3. Hematoma of scalp, initial encounter         Disposition:  Discharge  12/9/2024 12:22 pm    Follow-up:  Miracle Hernandez MD  [address] [id number] [phone]    In 1 week      North Miller MD  [address] [id number] [phone]      Hand/Ortho, As needed          Medications Prescribed:  Discharge Medication List as of 12/9/2024 12:41 PM              Supplementary Documentation:

## 2024-12-17 ENCOUNTER — PATIENT MESSAGE (OUTPATIENT)
Dept: FAMILY MEDICINE CLINIC | Facility: CLINIC | Age: 59
End: 2024-12-17

## 2024-12-17 DIAGNOSIS — F90.2 ADHD (ATTENTION DEFICIT HYPERACTIVITY DISORDER), COMBINED TYPE: ICD-10-CM

## 2024-12-18 RX ORDER — LISDEXAMFETAMINE DIMESYLATE 20 MG/1
20 CAPSULE ORAL EVERY MORNING
Qty: 30 CAPSULE | Refills: 0 | Status: SHIPPED | OUTPATIENT
Start: 2024-12-18 | End: 2025-01-17

## 2024-12-20 ENCOUNTER — NURSE ONLY (OUTPATIENT)
Dept: FAMILY MEDICINE CLINIC | Facility: CLINIC | Age: 59
End: 2024-12-20
Payer: COMMERCIAL

## 2024-12-20 DIAGNOSIS — Z23 NEED FOR VACCINATION: Primary | ICD-10-CM

## 2024-12-20 PROCEDURE — 90750 HZV VACC RECOMBINANT IM: CPT | Performed by: NURSE PRACTITIONER

## 2024-12-20 PROCEDURE — 90471 IMMUNIZATION ADMIN: CPT | Performed by: NURSE PRACTITIONER

## 2025-01-13 ENCOUNTER — OFFICE VISIT (OUTPATIENT)
Dept: FAMILY MEDICINE CLINIC | Facility: CLINIC | Age: 60
End: 2025-01-13
Payer: COMMERCIAL

## 2025-01-13 VITALS
TEMPERATURE: 99 F | HEART RATE: 111 BPM | HEIGHT: 61 IN | OXYGEN SATURATION: 98 % | WEIGHT: 196.81 LBS | BODY MASS INDEX: 37.16 KG/M2 | SYSTOLIC BLOOD PRESSURE: 128 MMHG | DIASTOLIC BLOOD PRESSURE: 82 MMHG

## 2025-01-13 DIAGNOSIS — J45.20 MILD INTERMITTENT ASTHMA WITHOUT COMPLICATION (HCC): ICD-10-CM

## 2025-01-13 DIAGNOSIS — J06.9 ACUTE URI: Primary | ICD-10-CM

## 2025-01-13 DIAGNOSIS — F90.2 ADHD (ATTENTION DEFICIT HYPERACTIVITY DISORDER), COMBINED TYPE: ICD-10-CM

## 2025-01-13 PROCEDURE — 3008F BODY MASS INDEX DOCD: CPT | Performed by: NURSE PRACTITIONER

## 2025-01-13 PROCEDURE — 3074F SYST BP LT 130 MM HG: CPT | Performed by: NURSE PRACTITIONER

## 2025-01-13 PROCEDURE — 3079F DIAST BP 80-89 MM HG: CPT | Performed by: NURSE PRACTITIONER

## 2025-01-13 PROCEDURE — 87637 SARSCOV2&INF A&B&RSV AMP PRB: CPT | Performed by: NURSE PRACTITIONER

## 2025-01-13 PROCEDURE — 99213 OFFICE O/P EST LOW 20 MIN: CPT | Performed by: NURSE PRACTITIONER

## 2025-01-13 RX ORDER — LISDEXAMFETAMINE DIMESYLATE 20 MG/1
20 CAPSULE ORAL EVERY MORNING
Qty: 30 CAPSULE | Refills: 0 | Status: SHIPPED | OUTPATIENT
Start: 2025-01-13 | End: 2025-02-12

## 2025-01-13 RX ORDER — ALBUTEROL SULFATE 90 UG/1
2 INHALANT RESPIRATORY (INHALATION) EVERY 4 HOURS PRN
Qty: 1 EACH | Refills: 1 | Status: SHIPPED | OUTPATIENT
Start: 2025-01-13

## 2025-01-13 NOTE — PROGRESS NOTES
HPI:   Cough  This is a new problem. Episode onset: 4 days ago, Thursday evening. The problem has been gradually improving. The cough is Non-productive. Associated symptoms include chills, ear congestion, ear pain, a fever (low grade), headaches, myalgias, postnasal drip, rhinorrhea (just started today), shortness of breath and wheezing. Pertinent negatives include no chest pain. The symptoms are aggravated by lying down. 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 She has tried OTC cough suppressant and a beta-agonist inhaler for the symptoms. The treatment provided significant relief. Her past medical history is significant for asthma.        Current Outpatient Medications   Medication Sig Dispense Refill    lisdexamfetamine (VYVANSE) 20 MG Oral Cap Take 1 capsule (20 mg total) by mouth every morning. 30 capsule 0    albuterol 108 (90 Base) MCG/ACT Inhalation Aero Soln Inhale 2 puffs into the lungs every 4 (four) hours as needed. 1 each 1    ibuprofen 200 MG Oral Tab Take 1 tablet (200 mg total) by mouth every 6 (six) hours as needed for Pain.      zinc sulfate 220 (50 Zn) MG Oral Cap Take 1 capsule (220 mg total) by mouth daily.      azelastine 0.1 % Nasal Solution 2 sprays by Nasal route 2 (two) times daily. 30 mL 0    B Complex Vitamins (B COMPLEX 100 OR)       Multiple Vitamins-Minerals (MULTI-VITAMIN/MINERALS) Oral Tab Take 1 tablet by mouth daily.      TURMERIC CURCUMIN OR Take by mouth.      Cholecalciferol (VITAMIN D3) 5000 UNITS Oral Cap Take 5000 iu daily 1 capsule 0      Past Medical History:    ADHD      Past Surgical History:   Procedure Laterality Date          x3    Colonoscopy N/A 2020    Procedure: COLONOSCOPY, POSSIBLE BIOPSY, POSSIBLE POLYPECTOMY 86644;  Surgeon: Tor Bah DO;  Location: [facility]    Mastoidectomy,radical Right     Middle ear surgery proc unlisted        Family History   Problem Relation Age of Onset    Breast Cancer Mother     Cancer Mother         breast     Hypertension Father     Cancer Father         prostate    Other (Other) Father     Cancer Sister 51        breast-now cancer free    Other (Other) Sister     Other (Other) Sister         RA    Other (Other) Daughter         histiocytosis, migraines      Social History     Socioeconomic History    Marital status:    Occupational History    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   HENT:  Positive for ear pain, postnasal drip, rhinorrhea (just started today) and sinus pressure.    Respiratory:  Positive for cough, shortness of breath and wheezing.    Cardiovascular:  Negative for chest pain.   Musculoskeletal:  Positive for myalgias.   Neurological:  Positive for headaches.   Psychiatric/Behavioral:  Positive for decreased concentration (needs refill of Vyvanse).        EXAM:   /82   Pulse 111   Temp 99.2 °F (37.3 °C) (Temporal)   Ht 5' 1\" (1.549 m)   Wt 196 lb 12.8 oz (89.3 kg)   LMP 12/19/2015   SpO2 98%   BMI 37.19 kg/m²   Physical Exam  Constitutional:       Appearance: Normal appearance. She is obese. She is ill-appearing.   HENT:      Right Ear: Tympanic membrane, ear canal and external ear normal.      Left Ear: Tympanic membrane, ear canal and external ear normal.      Nose: Rhinorrhea present. Rhinorrhea is clear.   Cardiovascular:      Rate and Rhythm: Normal rate and regular rhythm.      Heart sounds:  Normal heart sounds.   Pulmonary:      Effort: Pulmonary effort is normal.      Breath sounds: Normal breath sounds.   Neurological:      Mental Status: She is alert.         ASSESSMENT AND PLAN:   Diagnoses and all orders for this visit:    Acute URI  -     SARS-CoV-2/Flu A and B/RSV by PCR ([facility]) [E]; Future    Mild intermittent asthma without complication (HCC)  -     albuterol 108 (90 Base) MCG/ACT Inhalation Aero Soln; Inhale 2 puffs into the lungs every 4 (four) hours as needed.    ADHD (attention deficit hyperactivity disorder), combined type  -     lisdexamfetamine (VYVANSE) 20 MG Oral Cap; Take 1 capsule (20 mg total) by mouth every morning.    Viral swab sent today  Supportive care discussed for viral infection  Note provided for work  Medications refilled

## 2025-01-14 LAB
FLUAV + FLUBV RNA SPEC NAA+PROBE: DETECTED
FLUAV + FLUBV RNA SPEC NAA+PROBE: NOT DETECTED
RSV RNA SPEC NAA+PROBE: NOT DETECTED
SARS-COV-2 RNA RESP QL NAA+PROBE: NOT DETECTED

## 2025-01-15 ENCOUNTER — TELEPHONE (OUTPATIENT)
Dept: FAMILY MEDICINE CLINIC | Facility: CLINIC | Age: 60
End: 2025-01-15

## 2025-01-15 NOTE — TELEPHONE ENCOUNTER
----- Message from Marilynn Hyde sent at 1/15/2025  8:12 AM CST -----  Results reviewed.   + Influenza A

## 2025-01-17 ENCOUNTER — TELEPHONE (OUTPATIENT)
Dept: FAMILY MEDICINE CLINIC | Facility: CLINIC | Age: 60
End: 2025-01-17

## 2025-03-25 ENCOUNTER — PATIENT MESSAGE (OUTPATIENT)
Dept: FAMILY MEDICINE CLINIC | Facility: CLINIC | Age: 60
End: 2025-03-25

## 2025-03-25 DIAGNOSIS — F90.2 ADHD (ATTENTION DEFICIT HYPERACTIVITY DISORDER), COMBINED TYPE: ICD-10-CM

## 2025-03-25 NOTE — TELEPHONE ENCOUNTER
Last refill on 2/25/2025 quantity 30    Last office visit with SERGEI Bonilla on 10/11/2024 for ADHD

## 2025-03-26 RX ORDER — LISDEXAMFETAMINE DIMESYLATE 20 MG/1
20 CAPSULE ORAL EVERY MORNING
Qty: 30 CAPSULE | Refills: 0 | Status: SHIPPED | OUTPATIENT
Start: 2025-03-26 | End: 2025-04-25

## 2025-04-16 ENCOUNTER — APPOINTMENT (OUTPATIENT)
Dept: GENERAL RADIOLOGY | Age: 60
End: 2025-04-16
Attending: NURSE PRACTITIONER
Payer: COMMERCIAL

## 2025-04-16 ENCOUNTER — HOSPITAL ENCOUNTER (OUTPATIENT)
Age: 60
Discharge: HOME | End: 2025-04-16
Payer: COMMERCIAL

## 2025-04-16 VITALS
HEART RATE: 92 BPM | TEMPERATURE: 99 F | SYSTOLIC BLOOD PRESSURE: 144 MMHG | DIASTOLIC BLOOD PRESSURE: 99 MMHG | OXYGEN SATURATION: 93 % | RESPIRATION RATE: 20 BRPM

## 2025-04-16 DIAGNOSIS — J06.9 VIRAL URI WITH COUGH: Primary | ICD-10-CM

## 2025-04-16 DIAGNOSIS — J20.8 ACUTE VIRAL BRONCHITIS: ICD-10-CM

## 2025-04-16 PROCEDURE — 99214 OFFICE O/P EST MOD 30 MIN: CPT | Performed by: NURSE PRACTITIONER

## 2025-04-16 PROCEDURE — 71046 X-RAY EXAM CHEST 2 VIEWS: CPT | Performed by: NURSE PRACTITIONER

## 2025-04-16 RX ORDER — ALBUTEROL SULFATE 90 UG/1
2 INHALANT RESPIRATORY (INHALATION) EVERY 4 HOURS PRN
Qty: 1 EACH | Refills: 0 | Status: SHIPPED | OUTPATIENT
Start: 2025-04-16 | End: 2025-05-16

## 2025-04-16 RX ORDER — PREDNISONE 20 MG/1
40 TABLET ORAL DAILY
Qty: 10 TABLET | Refills: 0 | Status: SHIPPED | OUTPATIENT
Start: 2025-04-16 | End: 2025-04-21

## 2025-04-16 NOTE — ED PROVIDER NOTES
Patient Seen in: [facility]      History     Chief Complaint   Patient presents with    Cough     Stated Complaint: Cough; Fever    Subjective:   59-year-old female presents today with URI symptoms with worsening cough over the last week.  Reports having intermittent fevers over the last week.  No fever today.  Denies any vomiting diarrhea.  Alert oriented x 3.  Recent travel to [place].  No other symptoms or concerns.  The patient's medication list, past medical history and social history elements as listed in today's nurse's notes were reviewed and agreed (except as otherwise stated in the HPI).  The patient's family history reviewed and determined to be noncontributory to the presenting problem          History of Present Illness               Objective:     Past Medical History:    ADHD              Past Surgical History:   Procedure Laterality Date          x3    Colonoscopy N/A 2020    Procedure: COLONOSCOPY, POSSIBLE BIOPSY, POSSIBLE POLYPECTOMY 03421;  Surgeon: Tor Bah DO;  Location: [facility]    Mastoidectomy,radical Right     Middle ear surgery proc unlisted                  No pertinent social history.            Review of Systems    Positive for stated complaint: Cough; Fever  Other systems are as noted in HPI.  Constitutional and vital signs reviewed.      All other systems reviewed and negative except as noted above.                  Physical Exam     ED Triage Vitals [25 0812]   BP (!) 144/99   Pulse 92   Resp 20   Temp 98.6 °F (37 °C)   Temp src Oral   SpO2 93 %   O2 Device None (Room air)       Current Vitals:   Vital Signs  BP: (!) 144/99  Pulse: 92  Resp: 20  Temp: 98.6 °F (37 °C)  Temp src: Oral    Oxygen Therapy  SpO2: 93 %  O2 Device: None (Room air)        Physical Exam  Vitals and nursing note reviewed.   Constitutional:       Appearance: She is well-developed.   HENT:      Head: Normocephalic.      Right Ear: Tympanic membrane and ear canal  normal.      Left Ear: Tympanic membrane and ear canal normal.      Nose: Mucosal edema, congestion and rhinorrhea present.      Mouth/Throat:      Pharynx: Uvula midline. Posterior oropharyngeal erythema present.   Eyes:      Conjunctiva/sclera: Conjunctivae normal.      Pupils: Pupils are equal, round, and reactive to light.   Cardiovascular:      Rate and Rhythm: Normal rate and regular rhythm.   Pulmonary:      Effort: Pulmonary effort is normal.      Breath sounds: Normal breath sounds.      Comments: Harsh bronchial cough during exam.  Musculoskeletal:      Cervical back: Normal range of motion and neck supple.   Skin:     General: Skin is warm and dry.   Neurological:      Mental Status: She is alert and oriented to person, place, and time.           Physical Exam                ED Course   Labs Reviewed - No data to display       Results          XR CHEST PA + LAT CHEST (LVA=78724)  Result Date: 4/16/2025  PROCEDURE:  XR CHEST PA + LAT CHEST (CPT=71046)  INDICATIONS:  Cough; Fever  COMPARISON:  [facility], XR, XR CHEST PA + LAT CHEST (CPT=71046), 6/30/2023, 9:19 AM.  TECHNIQUE:  PA and lateral chest radiographs were obtained.  PATIENT STATED HISTORY: (As transcribed by Technologist)  Patient states she has had cough, headache and fever for the past week.    FINDINGS:  Normal heart size and pulmonary vascularity. No pleural effusion or pneumothorax. No lobar consolidation.            CONCLUSION:  No active cardiopulmonary process identified.   LOCATION:  [facility]   Dictated by (CST): José Tamez MD on 4/16/2025 at 8:41 AM     Finalized by (CST): José Tamez MD on 4/16/2025 at 8:41 AM                             MDM     Please note that this report has been produced using speech recognition software and may contain errors related to that system including, but not limited to, errors in grammar, punctuation, and spelling, as well as words and phrases that possibly may have been recognized  inappropriately.  If there are any questions or concerns, contact the dictating provider for clarification.              Medical Decision Making  Differential diagnosis includes but is not limited to: COVID-19, viral URI, strep throat, influenza, pneumonia, sinusitis, bronchitis      Presented today with URI symptoms with worsening cough for 1 week.  Recent travel to [place] and back.  Chest x-ray showed no consolidation acute findings.  Lungs were clear.  Patient does have a harsh bronchial cough.  Do suspect viral bronchitis.  Patient given prescription for prednisone as well as albuterol inhaler.  Supportive care discussed.  To follow-up with her primary care physician in 1 week if symptoms do not improve.  Patient verbalized understanding and agreed to plan of care.    Amount and/or Complexity of Data Reviewed  Radiology: ordered and independent interpretation performed. Decision-making details documented in ED Course.     Details: Chest x-ray    Risk  OTC drugs.  Prescription drug management.        Disposition and Plan     Clinical Impression:  1. Viral URI with cough    2. Acute viral bronchitis         Disposition:  Discharge  4/16/2025  8:45 am    Follow-up:  Miracle Hernandez MD  [address] [address] [phone]    In 1 week  As needed          Medications Prescribed:  Current Discharge Medication List        START taking these medications    Details   predniSONE 20 MG Oral Tab Take 2 tablets (40 mg total) by mouth daily for 5 days.  Qty: 10 tablet, Refills: 0      !! albuterol 108 (90 Base) MCG/ACT Inhalation Aero Soln Inhale 2 puffs into the lungs every 4 (four) hours as needed for Wheezing.  Qty: 1 each, Refills: 0       !! - Potential duplicate medications found. Please discuss with provider.          Supplementary Documentation:

## 2025-04-16 NOTE — ED INITIAL ASSESSMENT (HPI)
Patient here with c/o cough for over a week and states it's getting worse. Has had a HA and fever for a week.

## 2025-04-28 ENCOUNTER — TELEPHONE (OUTPATIENT)
Dept: FAMILY MEDICINE CLINIC | Facility: CLINIC | Age: 60
End: 2025-04-28

## 2025-04-28 ENCOUNTER — OFFICE VISIT (OUTPATIENT)
Dept: FAMILY MEDICINE CLINIC | Facility: CLINIC | Age: 60
End: 2025-04-28
Payer: COMMERCIAL

## 2025-04-28 VITALS
TEMPERATURE: 98 F | SYSTOLIC BLOOD PRESSURE: 114 MMHG | BODY MASS INDEX: 37 KG/M2 | WEIGHT: 196 LBS | HEART RATE: 95 BPM | DIASTOLIC BLOOD PRESSURE: 74 MMHG | OXYGEN SATURATION: 96 % | HEIGHT: 61 IN

## 2025-04-28 DIAGNOSIS — J01.00 ACUTE NON-RECURRENT MAXILLARY SINUSITIS: ICD-10-CM

## 2025-04-28 DIAGNOSIS — N89.8 VAGINAL ODOR: ICD-10-CM

## 2025-04-28 DIAGNOSIS — Z00.00 GENERAL MEDICAL EXAM: Primary | ICD-10-CM

## 2025-04-28 DIAGNOSIS — J45.21 MILD INTERMITTENT ASTHMA WITH ACUTE EXACERBATION (HCC): ICD-10-CM

## 2025-04-28 DIAGNOSIS — Z80.3 FAMILY HISTORY OF BREAST CANCER: ICD-10-CM

## 2025-04-28 DIAGNOSIS — F90.2 ADHD (ATTENTION DEFICIT HYPERACTIVITY DISORDER), COMBINED TYPE: ICD-10-CM

## 2025-04-28 DIAGNOSIS — Z12.11 COLON CANCER SCREENING: ICD-10-CM

## 2025-04-28 DIAGNOSIS — R92.8 ABNORMAL ULTRASOUND OF BREAST: ICD-10-CM

## 2025-04-28 DIAGNOSIS — Z78.0 ASYMPTOMATIC MENOPAUSE: ICD-10-CM

## 2025-04-28 DIAGNOSIS — Z12.4 CERVICAL CANCER SCREENING: ICD-10-CM

## 2025-04-28 PROCEDURE — 81514 NFCT DS BV&VAGINITIS DNA ALG: CPT | Performed by: NURSE PRACTITIONER

## 2025-04-28 PROCEDURE — 88175 CYTOPATH C/V AUTO FLUID REDO: CPT | Performed by: NURSE PRACTITIONER

## 2025-04-28 PROCEDURE — 87624 HPV HI-RISK TYP POOLED RSLT: CPT | Performed by: NURSE PRACTITIONER

## 2025-04-28 PROCEDURE — 87625 HPV TYPES 16 & 18 ONLY: CPT | Performed by: NURSE PRACTITIONER

## 2025-04-28 RX ORDER — PREDNISONE 20 MG/1
TABLET ORAL
Qty: 15 TABLET | Refills: 0 | Status: SHIPPED | OUTPATIENT
Start: 2025-04-28 | End: 2025-05-10

## 2025-04-28 NOTE — TELEPHONE ENCOUNTER
amoxicillin clavulanate 875-125 MG Oral Tab     Pts pharmacy called, states they have it on record that patient is allergic to Amoxicillin.  Does dr want to prescribe a different medication?

## 2025-04-28 NOTE — TELEPHONE ENCOUNTER
Patient seen today - prescribed Augmentin    Patient states has allergy to Penicillin - happened when she was in her 20s.    Has taken Amoxicillin before - patient will try this medication.    Message left at pharmacy that patient can take this medication per BRIANNE Hyde APRN

## 2025-04-28 NOTE — PROGRESS NOTES
HPI:   Patient is here for physical.    Concerns:   Still has cough from recent influenza infection. Chest feels heavy. Slight wheeze and rattle at night. No fever but feels warm. Used Albuterol and it helped.   Following up for Vyvanse. Slight dry mouth but drinks water and Biotene help. Feels the dose is working well for her. Wishes to continue.     Last Pap: due  Last Mammogram: due, goes to [facility]. Needs diagnostic order  Last DEXA: never  Last Colon Cancer Screen: 12/2020, repeat due later this year    Exercise: [place], Water Aerobics   Diet: balanced    Wt Readings from Last 6 Encounters:   04/28/25 196 lb (88.9 kg)   01/13/25 196 lb 12.8 oz (89.3 kg)   10/11/24 203 lb (92.1 kg)   06/07/24 203 lb (92.1 kg)   01/05/24 206 lb (93.4 kg)   12/26/23 199 lb (90.3 kg)     Body mass index is 37.03 kg/m².     Cholesterol, Total (mg/dL)   Date Value   01/05/2024 243 (H)   12/15/2020 218 (H)   02/12/2020 214 (H)     HDL Cholesterol (mg/dL)   Date Value   01/05/2024 67 (H)   12/15/2020 74 (H)   02/12/2020 80 (H)     LDL Cholesterol (mg/dL)   Date Value   01/05/2024 137 (H)   12/15/2020 122 (H)   02/12/2020 99     AST (U/L)   Date Value   01/05/2024 30   05/05/2021 11 (L)   12/15/2020 14 (L)   07/10/2012 14 (L)     ALT (U/L)   Date Value   01/05/2024 58 (H)   05/05/2021 25   12/15/2020 30   07/10/2012 20        Current Medications[1]   Past Medical History[2]   Past Surgical History[3]   Family History[4]   Social History:   Short Social Hx on File[5]     REVIEW OF SYSTEMS:   Review of Systems   Constitutional:  Negative for chills, fatigue, fever and unexpected weight change.   HENT:  Positive for rhinorrhea (slightly, allergies?). Negative for congestion, ear pain, postnasal drip, sore throat and trouble swallowing.    Eyes:  Negative for visual disturbance.   Respiratory:  Positive for cough and chest tightness. Negative for shortness of breath.    Cardiovascular:  Negative for chest pain and palpitations.    Gastrointestinal:  Positive for abdominal distention and constipation (taking Miralax). Negative for abdominal pain (always), blood in stool, diarrhea, nausea and vomiting.   Endocrine: Negative for cold intolerance, heat intolerance, polydipsia, polyphagia and polyuria.   Genitourinary:  Negative for dysuria, frequency, hematuria, pelvic pain and vaginal bleeding.   Musculoskeletal:  Negative for back pain.   Skin:  Negative for rash.   Neurological:  Negative for headaches.   Hematological:  Does not bruise/bleed easily.   Psychiatric/Behavioral:  Positive for decreased concentration (improved with med). Negative for dysphoric mood and sleep disturbance. The patient is not nervous/anxious.        EXAM:   /74   Pulse 95   Temp 98.2 °F (36.8 °C)   Ht 5' 1\" (1.549 m)   Wt 196 lb (88.9 kg)   LMP 12/19/2015   SpO2 96%   BMI 37.03 kg/m²   Ideal body weight: 47.8 kg (105 lb 6.1 oz)  Adjusted ideal body weight: 64.2 kg (141 lb 10 oz)   Physical Exam  Chaperone present: declined.   Constitutional:       General: She is not in acute distress.     Appearance: She is well-developed and well-groomed. She is obese. She is not ill-appearing.   HENT:      Right Ear: Tympanic membrane, ear canal and external ear normal.      Left Ear: Tympanic membrane, ear canal and external ear normal.      Nose: Mucosal edema and rhinorrhea present. Rhinorrhea is purulent.      Mouth/Throat:      Mouth: Mucous membranes are moist.      Pharynx: Oropharynx is clear.   Eyes:      Conjunctiva/sclera: Conjunctivae normal.      Pupils: Pupils are equal, round, and reactive to light.   Neck:      Thyroid: No thyromegaly.   Cardiovascular:      Rate and Rhythm: Normal rate and regular rhythm.      Heart sounds: Normal heart sounds.   Pulmonary:      Effort: Pulmonary effort is normal.      Breath sounds: Examination of the left-upper field reveals wheezing. Examination of the left-middle field reveals wheezing. Examination of the  left-lower field reveals wheezing. Wheezing (expiratory) present.   Chest:   Breasts:     Right: Normal.      Left: Normal.   Abdominal:      General: Bowel sounds are normal. There is no distension.      Palpations: Abdomen is soft.      Tenderness: There is no abdominal tenderness.   Genitourinary:     General: Normal vulva.      Labia:         Right: No rash or tenderness.         Left: No rash or tenderness.       Vagina: Vaginal discharge (scant, yellow) present.      Cervix: No cervical motion tenderness, discharge or friability.      Uterus: Normal.       Adnexa:         Right: No mass or tenderness.          Left: No mass or tenderness.        Comments: Atrophy present  Musculoskeletal:         General: Normal range of motion.      Cervical back: Normal range of motion.   Lymphadenopathy:      Upper Body:      Right upper body: No supraclavicular, axillary or pectoral adenopathy.      Left upper body: No supraclavicular, axillary or pectoral adenopathy.   Skin:     General: Skin is warm and dry.   Neurological:      General: No focal deficit present.      Mental Status: She is alert and oriented to person, place, and time.   Psychiatric:         Mood and Affect: Mood normal.         Behavior: Behavior normal. Behavior is cooperative.         ASSESSMENT AND PLAN:   Diagnoses and all orders for this visit:    General medical exam  Comments:  will return for fasting labs  Orders:  -     Hemoglobin A1C; Future  -     TSH W Reflex To Free T4; Future  -     Comp Metabolic Panel (14); Future  -     Lipid Panel; Future  -     CBC With Differential With Platelet; Future    Cervical cancer screening  -     ThinPrep PAP Smear; Future  -     Hpv Dna  High Risk , Thin Prep Collect; Future    Colon cancer screening  Comments:  referral placed for general surgeon  Orders:  -     Surgery Referral - In Network    Mild intermittent asthma with acute exacerbation (HCC)  Comments:  ora prednisone ordered, continue PRN  Albuterol  Orders:  -     predniSONE 20 MG Oral Tab; Take 2 tablets (40 mg total) by mouth daily for 3 days, THEN 1.5 tablets (30 mg total) daily for 3 days, THEN 1 tablet (20 mg total) daily for 3 days, THEN 0.5 tablets (10 mg total) daily for 3 days.    Acute non-recurrent maxillary sinusitis  -     amoxicillin clavulanate 875-125 MG Oral Tab; Take 1 tablet by mouth 2 (two) times daily for 10 days.    ADHD (attention deficit hyperactivity disorder), combined type  Comments:  doing well with current dose of Vyvnase, will call when due for refill. RTC in 6 months    Abnormal ultrasound of breast  Comments:  scheduled diagnostic mammogram & u/s at [facility]  Orders:  -     GERALD JENIFER 2D+3D DIAGNOSTIC GERALD  BILAT (CPT=77066/92902); Future    Family history of breast cancer  -     GERALD JENIFER 2D+3D DIAGNOSTIC GERALD  BILAT (CPT=77066/71038); Future    Vaginal odor  Comments:  vaginitis panel done, no obvious infection on exam today. Likely related to menopause  Orders:  -     Vaginitis Vaginosis PCR Panel; Future    Asymptomatic menopause  Comments:  needs to start bone density screening  Orders:  -     XR DEXA BONE DENSITOMETRY (CPT=77080); Future    Other orders  -     Cancel: GERALD JENIFER 2D+3D SCREENING BILAT (CPT=77067/63759); Future      Note to patient: The 21st Century Cures Act makes medical notes like these available to patients in the interest of transparency. However, be advised this is a medical document. It is intended as peer to peer communication. It is written in medical language and may contain abbreviations or verbiage that are unfamiliar. It may appear blunt or direct. Medical documents are intended to carry relevant information, facts as evident, and the clinical opinion of the practitioner.             [1]   Current Outpatient Medications   Medication Sig Dispense Refill    predniSONE 20 MG Oral Tab Take 2 tablets (40 mg total) by mouth daily for 3 days, THEN 1.5 tablets (30 mg total) daily for 3 days, THEN 1  tablet (20 mg total) daily for 3 days, THEN 0.5 tablets (10 mg total) daily for 3 days. 15 tablet 0    amoxicillin clavulanate 875-125 MG Oral Tab Take 1 tablet by mouth 2 (two) times daily for 10 days. 20 tablet 0    albuterol 108 (90 Base) MCG/ACT Inhalation Aero Soln Inhale 2 puffs into the lungs every 4 (four) hours as needed for Wheezing. 1 each 0    albuterol 108 (90 Base) MCG/ACT Inhalation Aero Soln Inhale 2 puffs into the lungs every 4 (four) hours as needed. 1 each 1    ibuprofen 200 MG Oral Tab Take 1 tablet (200 mg total) by mouth every 6 (six) hours as needed for Pain.      B Complex Vitamins (B COMPLEX 100 OR)       Multiple Vitamins-Minerals (MULTI-VITAMIN/MINERALS) Oral Tab Take 1 tablet by mouth in the morning.      Cholecalciferol (VITAMIN D3) 5000 UNITS Oral Cap Take 5000 iu daily 1 capsule 0   [2]   Past Medical History:   ADHD   [3]   Past Surgical History:  Procedure Laterality Date          x3    Colonoscopy N/A 2020    Procedure: COLONOSCOPY, POSSIBLE BIOPSY, POSSIBLE POLYPECTOMY 42729;  Surgeon: Tor Bah DO;  Location: [facility]    Mastoidectomy,radical Right     Middle ear surgery proc unlisted     [4]   Family History  Problem Relation Age of Onset    Breast Cancer Mother     Cancer Mother         breast    Hypertension Father     Cancer Father         prostate    Other (Other) Father     Cancer Sister 51        breast-now cancer free    Other (Other) Sister     Other (Other) Sister         RA    Other (Other) Daughter         histiocytosis, migraines   [5]   Social History  Socioeconomic History    Marital status:    Occupational History    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

## 2025-04-29 LAB
BV BACTERIA DNA VAG QL NAA+PROBE: NEGATIVE
C GLABRATA DNA VAG QL NAA+PROBE: NEGATIVE
C KRUSEI DNA VAG QL NAA+PROBE: NEGATIVE
CANDIDA DNA VAG QL NAA+PROBE: NEGATIVE
HPV E6+E7 MRNA CVX QL NAA+PROBE: POSITIVE
T VAGINALIS DNA VAG QL NAA+PROBE: NEGATIVE

## 2025-04-30 ENCOUNTER — TELEPHONE (OUTPATIENT)
Dept: FAMILY MEDICINE CLINIC | Facility: CLINIC | Age: 60
End: 2025-04-30

## 2025-04-30 NOTE — TELEPHONE ENCOUNTER
4/30/2025  8:04 AM Y Nicky Cleveland RN Patient Medical Advice Request  test resuilts     Patient viewed [employer]

## 2025-04-30 NOTE — TELEPHONE ENCOUNTER
----- Message from Marilynn Hyde sent at 4/29/2025  4:22 PM CDT -----  Results reviewed.   + HPV, pap is pending  Given + HPV, she should see gyne for further evaluation. Refer to Dr. Haskins    Vaginitis panel is negative, no infection    ----- Message -----  From: Lab, Background User  Sent: 4/29/2025  12:13 PM CDT  To: SERGEI Arias

## 2025-05-01 ENCOUNTER — PATIENT MESSAGE (OUTPATIENT)
Dept: FAMILY MEDICINE CLINIC | Facility: CLINIC | Age: 60
End: 2025-05-01

## 2025-05-01 LAB
HPV16 DNA CVX QL PROBE+SIG AMP: NEGATIVE
HPV18 DNA CVX QL PROBE+SIG AMP: NEGATIVE

## 2025-05-01 NOTE — TELEPHONE ENCOUNTER
Me to Sumaya Castillo  [place]      4/30/25  8:04 AM  Sumaya,  Pap is pending. The HPV was positive. She said that given that this is positive, she recommends to see gyne for further evaluation. Vaginitis panel was negative. If you don't have a gyne she placed a referral for [facility]. Their phone number is [phone].  Any questions let us know.  -Nicky REEVES, RN  [facility] Office Phone: [phone]    Last read by Sumaya Castillo at 1:58PM on 4/30/2025.

## 2025-05-01 NOTE — TELEPHONE ENCOUNTER
----- Message from Marilynn Hyde sent at 5/1/2025  2:49 PM CDT -----  Results reviewed.   Mildly abnormal pap, see gyne   ----- Message -----  From: Lab, Background User  Sent: 4/29/2025  12:13 PM CDT  To: Marilynn Hyde, APRN

## 2025-05-13 ENCOUNTER — PATIENT MESSAGE (OUTPATIENT)
Dept: FAMILY MEDICINE CLINIC | Facility: CLINIC | Age: 60
End: 2025-05-13

## 2025-05-13 DIAGNOSIS — F90.2 ADHD (ATTENTION DEFICIT HYPERACTIVITY DISORDER), COMBINED TYPE: ICD-10-CM

## 2025-05-13 NOTE — TELEPHONE ENCOUNTER
Last office visit: 4/28/25    No future appointments.    Last filled: 3/26/25 Qty 30 R 0    Last labs: 1/5/24 - Donewst message sent to patient advising she needs to schedule labs. Orders are in chart    Last BP: 114/74 as of 4/28/25

## 2025-05-14 RX ORDER — LISDEXAMFETAMINE DIMESYLATE 20 MG/1
20 CAPSULE ORAL EVERY MORNING
Qty: 30 CAPSULE | Refills: 0 | Status: SHIPPED | OUTPATIENT
Start: 2025-05-14 | End: 2025-06-13

## 2025-08-08 ENCOUNTER — LAB ENCOUNTER (OUTPATIENT)
Dept: LAB | Age: 60
End: 2025-08-08
Attending: NURSE PRACTITIONER

## 2025-08-08 ENCOUNTER — PATIENT MESSAGE (OUTPATIENT)
Dept: FAMILY MEDICINE CLINIC | Facility: CLINIC | Age: 60
End: 2025-08-08

## 2025-08-08 DIAGNOSIS — F90.2 ADHD (ATTENTION DEFICIT HYPERACTIVITY DISORDER), COMBINED TYPE: ICD-10-CM

## 2025-08-08 DIAGNOSIS — Z00.00 GENERAL MEDICAL EXAM: ICD-10-CM

## 2025-08-08 LAB
ALBUMIN SERPL-MCNC: 4.6 G/DL (ref 3.2–4.8)
ALBUMIN/GLOB SERPL: 1.7 (ref 1–2)
ALP LIVER SERPL-CCNC: 100 U/L (ref 50–130)
ALT SERPL-CCNC: 34 U/L (ref 10–49)
ANION GAP SERPL CALC-SCNC: 11 MMOL/L (ref 0–18)
AST SERPL-CCNC: 27 U/L (ref ?–34)
BASOPHILS # BLD AUTO: 0.05 X10(3) UL (ref 0–0.2)
BASOPHILS NFR BLD AUTO: 0.7 %
BILIRUB SERPL-MCNC: 0.8 MG/DL (ref 0.2–1.1)
BUN BLD-MCNC: 15 MG/DL (ref 9–23)
CALCIUM BLD-MCNC: 9.5 MG/DL (ref 8.7–10.6)
CHLORIDE SERPL-SCNC: 103 MMOL/L (ref 98–112)
CO2 SERPL-SCNC: 27 MMOL/L (ref 21–32)
CREAT BLD-MCNC: 0.91 MG/DL (ref 0.55–1.02)
EGFRCR SERPLBLD CKD-EPI 2021: 71 ML/MIN/1.73M2 (ref 60–?)
EOSINOPHIL # BLD AUTO: 0.05 X10(3) UL (ref 0–0.7)
EOSINOPHIL NFR BLD AUTO: 0.7 %
ERYTHROCYTE [DISTWIDTH] IN BLOOD BY AUTOMATED COUNT: 12.4 %
EST. AVERAGE GLUCOSE BLD GHB EST-MCNC: 103 MG/DL (ref 68–126)
FASTING STATUS PATIENT QL REPORTED: YES
GLOBULIN PLAS-MCNC: 2.7 G/DL (ref 2–3.5)
GLUCOSE BLD-MCNC: 111 MG/DL (ref 70–99)
HBA1C MFR BLD: 5.2 % (ref ?–5.7)
HCT VFR BLD AUTO: 44.1 % (ref 35–48)
HGB BLD-MCNC: 14.8 G/DL (ref 12–16)
IMM GRANULOCYTES # BLD AUTO: 0.02 X10(3) UL (ref 0–1)
IMM GRANULOCYTES NFR BLD: 0.3 %
LYMPHOCYTES # BLD AUTO: 2.69 X10(3) UL (ref 1–4)
LYMPHOCYTES NFR BLD AUTO: 36.8 %
MCH RBC QN AUTO: 31.2 PG (ref 26–34)
MCHC RBC AUTO-ENTMCNC: 33.6 G/DL (ref 31–37)
MCV RBC AUTO: 92.8 FL (ref 80–100)
MONOCYTES # BLD AUTO: 0.44 X10(3) UL (ref 0.1–1)
MONOCYTES NFR BLD AUTO: 6 %
NEUTROPHILS # BLD AUTO: 4.06 X10 (3) UL (ref 1.5–7.7)
NEUTROPHILS # BLD AUTO: 4.06 X10(3) UL (ref 1.5–7.7)
NEUTROPHILS NFR BLD AUTO: 55.5 %
OSMOLALITY SERPL CALC.SUM OF ELEC: 294 MOSM/KG (ref 275–295)
PLATELET # BLD AUTO: 410 10(3)UL (ref 150–450)
POTASSIUM SERPL-SCNC: 4.3 MMOL/L (ref 3.5–5.1)
PROT SERPL-MCNC: 7.3 G/DL (ref 5.7–8.2)
RBC # BLD AUTO: 4.75 X10(6)UL (ref 3.8–5.3)
SODIUM SERPL-SCNC: 141 MMOL/L (ref 136–145)
TSI SER-ACNC: 1.48 UIU/ML (ref 0.55–4.78)
WBC # BLD AUTO: 7.3 X10(3) UL (ref 4–11)

## 2025-08-08 PROCEDURE — 80050 GENERAL HEALTH PANEL: CPT | Performed by: NURSE PRACTITIONER

## 2025-08-08 PROCEDURE — 83036 HEMOGLOBIN GLYCOSYLATED A1C: CPT | Performed by: NURSE PRACTITIONER

## 2025-08-08 RX ORDER — LISDEXAMFETAMINE DIMESYLATE 20 MG/1
20 CAPSULE ORAL EVERY MORNING
Qty: 30 CAPSULE | Refills: 0 | Status: SHIPPED | OUTPATIENT
Start: 2025-08-08 | End: 2025-09-07

## 2025-08-11 ENCOUNTER — RESULTS FOLLOW-UP (OUTPATIENT)
Dept: FAMILY MEDICINE CLINIC | Facility: CLINIC | Age: 60
End: 2025-08-11

## 2025-08-12 ENCOUNTER — HOSPITAL ENCOUNTER (OUTPATIENT)
Dept: BONE DENSITY | Age: 60
Discharge: HOME | End: 2025-08-12
Attending: NURSE PRACTITIONER

## 2025-08-12 DIAGNOSIS — Z78.0 ASYMPTOMATIC MENOPAUSE: ICD-10-CM

## 2025-08-12 PROCEDURE — 77080 DXA BONE DENSITY AXIAL: CPT | Performed by: NURSE PRACTITIONER

## (undated) DIAGNOSIS — F90.2 ADHD (ATTENTION DEFICIT HYPERACTIVITY DISORDER), COMBINED TYPE: ICD-10-CM

## (undated) DIAGNOSIS — Z76.0 MEDICATION REFILL: ICD-10-CM

## (undated) DIAGNOSIS — Z51.81 MEDICATION MONITORING ENCOUNTER: ICD-10-CM

## (undated) NOTE — Clinical Note
Leno Baumann saw Nunu Sharma in the office today. She presents for screening colonoscopy. She is currently scheduled for December 2.   Thank you Sherley Hale

## (undated) NOTE — LETTER
Date: 1/13/2025    Patient Name: Sumaya Castillo          To Whom it may concern:    This letter has been written at the patient's request. The above patient was seen at [facility] for treatment of a medical condition.    This patient should be excused from attending work beginning 1/10/2025. May return to work once she is fever-free for 24 hours without the use of fever reducing medication.      Sincerely,    SERGEI Arias

## (undated) NOTE — MR AVS SNAPSHOT
After Visit Summary   12/15/2020    Geneva Ngo    MRN: [id number]           Visit Information     Date & Time  12/15/2020 10:00 AM Provider  Clara George MD Department  [facility] Shoulders Dept.  Phone Cholecalciferol (VITAMIN D3) 5000 UNITS Oral Cap Take 5000 iu daily      Diagnoses for This Visit    Routine history and physical examination of adult   [781574]  -  Primary  ADHD (attention deficit hyperactivity disorder), combined type   [364118]    Cer HPV HIGH RISK , THIN PREP COLLECTION [PXF9175 CUSTOM]  12/15/2020 12/15/2021    LIPID PANEL [0503866 CUSTOM]  12/15/2020 (Approximate) 12/15/2021    SARS-COV-2 TOTAL ANTIBODY [[id number] CUSTOM]  12/15/2020 (Approximate) 12/15/2021    T PALLIDUM SCREENING CA Treatment for mild illness or injury that does not require immediate attention.  Average cost  $70*   [facility]  Monday – Friday  8:00 am – 8:00 pm   Saturday – Sunday  8:00 am – 4:00 pm    W[facility]-IN CARE  Primary Care

## (undated) NOTE — LETTER
Date & Time: 12/9/2024, 12:23 PM  Patient: Sumaya Castillo  Encounter Provider(s):    Moriah Duran APRN       To Whom It May Concern:    Sumaya Castillo was seen and treated in our department on 12/9/2024. She can return to work once symptoms improving.     If you have any questions or concerns, please do not hesitate to call.        _____________________________  Physician/APC Signature

## (undated) NOTE — LETTER
[facility], [address] [address]  [phone]            7/23/2019        Franko Berumen  [facility] [address]      Dear Patient,     According to ou

## (undated) NOTE — MR AVS SNAPSHOT
[address] [facility] [address] [phone]               Thank you for choosing us for your health care visit with Ray Zhang MD.  We are glad to serve you and happy to provide you with this sum Take 1 capsule (30 mg total) by mouth daily. What changed: You were already taking a medication with the same name, and this prescription was added. Make sure you understand how and when to take each.    Commonly known as:  VYVANSE   Start taking on:  3/